# Patient Record
Sex: FEMALE | Race: WHITE | NOT HISPANIC OR LATINO | Employment: OTHER
[De-identification: names, ages, dates, MRNs, and addresses within clinical notes are randomized per-mention and may not be internally consistent; named-entity substitution may affect disease eponyms.]

---

## 2017-02-02 ENCOUNTER — RECORDS - HEALTHEAST (OUTPATIENT)
Dept: ADMINISTRATIVE | Facility: OTHER | Age: 82
End: 2017-02-02

## 2017-02-09 ENCOUNTER — RECORDS - HEALTHEAST (OUTPATIENT)
Dept: ADMINISTRATIVE | Facility: OTHER | Age: 82
End: 2017-02-09

## 2017-05-16 ENCOUNTER — COMMUNICATION - HEALTHEAST (OUTPATIENT)
Dept: INTERNAL MEDICINE | Facility: CLINIC | Age: 82
End: 2017-05-16

## 2017-05-16 ENCOUNTER — COMMUNICATION - HEALTHEAST (OUTPATIENT)
Dept: SCHEDULING | Facility: CLINIC | Age: 82
End: 2017-05-16

## 2017-05-16 ENCOUNTER — AMBULATORY - HEALTHEAST (OUTPATIENT)
Dept: LAB | Facility: CLINIC | Age: 82
End: 2017-05-16

## 2017-05-16 DIAGNOSIS — R30.0 DYSURIA: ICD-10-CM

## 2017-05-16 DIAGNOSIS — N39.0 UTI (URINARY TRACT INFECTION): ICD-10-CM

## 2017-05-24 ENCOUNTER — COMMUNICATION - HEALTHEAST (OUTPATIENT)
Dept: SCHEDULING | Facility: CLINIC | Age: 82
End: 2017-05-24

## 2017-05-24 DIAGNOSIS — R30.0 DYSURIA: ICD-10-CM

## 2017-06-05 ENCOUNTER — AMBULATORY - HEALTHEAST (OUTPATIENT)
Dept: LAB | Facility: CLINIC | Age: 82
End: 2017-06-05

## 2017-06-05 DIAGNOSIS — R30.0 DYSURIA: ICD-10-CM

## 2017-06-08 ENCOUNTER — COMMUNICATION - HEALTHEAST (OUTPATIENT)
Dept: INTERNAL MEDICINE | Facility: CLINIC | Age: 82
End: 2017-06-08

## 2017-06-08 DIAGNOSIS — N39.0 UTI (URINARY TRACT INFECTION): ICD-10-CM

## 2017-06-12 ENCOUNTER — COMMUNICATION - HEALTHEAST (OUTPATIENT)
Dept: INTERNAL MEDICINE | Facility: CLINIC | Age: 82
End: 2017-06-12

## 2017-06-12 DIAGNOSIS — N39.0 UTI (URINARY TRACT INFECTION): ICD-10-CM

## 2017-06-15 ENCOUNTER — AMBULATORY - HEALTHEAST (OUTPATIENT)
Dept: LAB | Facility: CLINIC | Age: 82
End: 2017-06-15

## 2017-06-15 DIAGNOSIS — N39.0 UTI (URINARY TRACT INFECTION): ICD-10-CM

## 2017-06-16 ENCOUNTER — COMMUNICATION - HEALTHEAST (OUTPATIENT)
Dept: INTERNAL MEDICINE | Facility: CLINIC | Age: 82
End: 2017-06-16

## 2017-06-26 ENCOUNTER — RECORDS - HEALTHEAST (OUTPATIENT)
Dept: ADMINISTRATIVE | Facility: OTHER | Age: 82
End: 2017-06-26

## 2017-08-09 ENCOUNTER — COMMUNICATION - HEALTHEAST (OUTPATIENT)
Dept: INTERNAL MEDICINE | Facility: CLINIC | Age: 82
End: 2017-08-09

## 2017-10-02 ENCOUNTER — COMMUNICATION - HEALTHEAST (OUTPATIENT)
Dept: INTERNAL MEDICINE | Facility: CLINIC | Age: 82
End: 2017-10-02

## 2017-10-02 DIAGNOSIS — E78.5 HYPERLIPIDEMIA: ICD-10-CM

## 2017-10-23 ENCOUNTER — COMMUNICATION - HEALTHEAST (OUTPATIENT)
Dept: INTERNAL MEDICINE | Facility: CLINIC | Age: 82
End: 2017-10-23

## 2017-10-23 DIAGNOSIS — I10 HYPERTENSION: ICD-10-CM

## 2017-11-17 ENCOUNTER — OFFICE VISIT - HEALTHEAST (OUTPATIENT)
Dept: INTERNAL MEDICINE | Facility: CLINIC | Age: 82
End: 2017-11-17

## 2017-11-17 DIAGNOSIS — E78.5 HYPERLIPIDEMIA: ICD-10-CM

## 2017-11-17 DIAGNOSIS — K21.9 ESOPHAGEAL REFLUX: ICD-10-CM

## 2017-11-17 DIAGNOSIS — I10 HYPERTENSION: ICD-10-CM

## 2017-11-17 DIAGNOSIS — Z51.81 MEDICATION MONITORING ENCOUNTER: ICD-10-CM

## 2017-11-17 LAB
CHOLEST SERPL-MCNC: 169 MG/DL
FASTING STATUS PATIENT QL REPORTED: YES
HDLC SERPL-MCNC: 37 MG/DL
LDLC SERPL CALC-MCNC: 77 MG/DL
TRIGL SERPL-MCNC: 277 MG/DL

## 2017-11-17 ASSESSMENT — MIFFLIN-ST. JEOR: SCORE: 1281.84

## 2017-11-21 ENCOUNTER — COMMUNICATION - HEALTHEAST (OUTPATIENT)
Dept: INTERNAL MEDICINE | Facility: CLINIC | Age: 82
End: 2017-11-21

## 2017-11-26 ENCOUNTER — COMMUNICATION - HEALTHEAST (OUTPATIENT)
Dept: INTERNAL MEDICINE | Facility: CLINIC | Age: 82
End: 2017-11-26

## 2017-12-15 ENCOUNTER — RECORDS - HEALTHEAST (OUTPATIENT)
Dept: ADMINISTRATIVE | Facility: OTHER | Age: 82
End: 2017-12-15

## 2018-02-02 ENCOUNTER — COMMUNICATION - HEALTHEAST (OUTPATIENT)
Dept: SCHEDULING | Facility: CLINIC | Age: 83
End: 2018-02-02

## 2018-02-02 DIAGNOSIS — N39.0 UTI (URINARY TRACT INFECTION): ICD-10-CM

## 2018-09-13 ENCOUNTER — AMBULATORY - HEALTHEAST (OUTPATIENT)
Dept: INTERNAL MEDICINE | Facility: CLINIC | Age: 83
End: 2018-09-13

## 2018-09-13 ENCOUNTER — COMMUNICATION - HEALTHEAST (OUTPATIENT)
Dept: SCHEDULING | Facility: CLINIC | Age: 83
End: 2018-09-13

## 2018-09-13 ENCOUNTER — AMBULATORY - HEALTHEAST (OUTPATIENT)
Dept: LAB | Facility: CLINIC | Age: 83
End: 2018-09-13

## 2018-09-13 DIAGNOSIS — N30.00 ACUTE CYSTITIS WITHOUT HEMATURIA: ICD-10-CM

## 2018-09-13 LAB
ALBUMIN UR-MCNC: NEGATIVE MG/DL
APPEARANCE UR: ABNORMAL
BACTERIA #/AREA URNS HPF: ABNORMAL HPF
BILIRUB UR QL STRIP: NEGATIVE
COLOR UR AUTO: ABNORMAL
GLUCOSE UR STRIP-MCNC: NEGATIVE MG/DL
HGB UR QL STRIP: NEGATIVE
KETONES UR STRIP-MCNC: NEGATIVE MG/DL
LEUKOCYTE ESTERASE UR QL STRIP: ABNORMAL
MUCOUS THREADS #/AREA URNS LPF: ABNORMAL LPF
NITRATE UR QL: NEGATIVE
PH UR STRIP: 5.5 [PH] (ref 4.5–8)
RBC #/AREA URNS AUTO: ABNORMAL HPF
SP GR UR STRIP: 1.01 (ref 1–1.03)
SQUAMOUS #/AREA URNS AUTO: ABNORMAL LPF
UROBILINOGEN UR STRIP-ACNC: ABNORMAL
WBC #/AREA URNS AUTO: ABNORMAL HPF
WBC CLUMPS #/AREA URNS HPF: PRESENT /[HPF]

## 2018-09-15 LAB — BACTERIA SPEC CULT: ABNORMAL

## 2018-09-16 ENCOUNTER — COMMUNICATION - HEALTHEAST (OUTPATIENT)
Dept: INTERNAL MEDICINE | Facility: CLINIC | Age: 83
End: 2018-09-16

## 2018-09-20 ENCOUNTER — COMMUNICATION - HEALTHEAST (OUTPATIENT)
Dept: INTERNAL MEDICINE | Facility: CLINIC | Age: 83
End: 2018-09-20

## 2018-09-20 DIAGNOSIS — N30.00 ACUTE CYSTITIS WITHOUT HEMATURIA: ICD-10-CM

## 2018-10-19 ENCOUNTER — COMMUNICATION - HEALTHEAST (OUTPATIENT)
Dept: INTERNAL MEDICINE | Facility: CLINIC | Age: 83
End: 2018-10-19

## 2018-10-19 DIAGNOSIS — I10 ESSENTIAL HYPERTENSION: ICD-10-CM

## 2018-11-19 ENCOUNTER — OFFICE VISIT - HEALTHEAST (OUTPATIENT)
Dept: INTERNAL MEDICINE | Facility: CLINIC | Age: 83
End: 2018-11-19

## 2018-11-19 DIAGNOSIS — M81.0 SENILE OSTEOPOROSIS: ICD-10-CM

## 2018-11-19 DIAGNOSIS — E66.01 MORBID OBESITY (H): ICD-10-CM

## 2018-11-19 DIAGNOSIS — Z00.00 MEDICARE ANNUAL WELLNESS VISIT, SUBSEQUENT: ICD-10-CM

## 2018-11-19 DIAGNOSIS — I10 HYPERTENSION: ICD-10-CM

## 2018-11-19 DIAGNOSIS — Z12.31 VISIT FOR SCREENING MAMMOGRAM: ICD-10-CM

## 2018-11-19 DIAGNOSIS — K21.9 ESOPHAGEAL REFLUX: ICD-10-CM

## 2018-11-19 DIAGNOSIS — Z51.81 MEDICATION MONITORING ENCOUNTER: ICD-10-CM

## 2018-11-19 DIAGNOSIS — E78.2 MIXED HYPERLIPIDEMIA: ICD-10-CM

## 2018-11-19 DIAGNOSIS — I10 ESSENTIAL HYPERTENSION: ICD-10-CM

## 2018-11-19 DIAGNOSIS — N30.01 ACUTE CYSTITIS WITH HEMATURIA: ICD-10-CM

## 2018-11-19 LAB
ALBUMIN SERPL-MCNC: 3.9 G/DL (ref 3.5–5)
ALBUMIN UR-MCNC: NEGATIVE MG/DL
ALP SERPL-CCNC: 72 U/L (ref 45–120)
ALT SERPL W P-5'-P-CCNC: 20 U/L (ref 0–45)
ANION GAP SERPL CALCULATED.3IONS-SCNC: 9 MMOL/L (ref 5–18)
APPEARANCE UR: CLEAR
AST SERPL W P-5'-P-CCNC: 18 U/L (ref 0–40)
BACTERIA #/AREA URNS HPF: ABNORMAL HPF
BILIRUB SERPL-MCNC: 0.7 MG/DL (ref 0–1)
BILIRUB UR QL STRIP: NEGATIVE
BUN SERPL-MCNC: 14 MG/DL (ref 8–28)
CALCIUM SERPL-MCNC: 9.3 MG/DL (ref 8.5–10.5)
CHLORIDE BLD-SCNC: 103 MMOL/L (ref 98–107)
CHOLEST SERPL-MCNC: 169 MG/DL
CO2 SERPL-SCNC: 29 MMOL/L (ref 22–31)
COLOR UR AUTO: YELLOW
CREAT SERPL-MCNC: 0.68 MG/DL (ref 0.6–1.1)
ERYTHROCYTE [DISTWIDTH] IN BLOOD BY AUTOMATED COUNT: 12.2 % (ref 11–14.5)
FASTING STATUS PATIENT QL REPORTED: YES
GFR SERPL CREATININE-BSD FRML MDRD: >60 ML/MIN/1.73M2
GLUCOSE BLD-MCNC: 118 MG/DL (ref 70–125)
GLUCOSE UR STRIP-MCNC: NEGATIVE MG/DL
HCT VFR BLD AUTO: 39.7 % (ref 35–47)
HDLC SERPL-MCNC: 40 MG/DL
HGB BLD-MCNC: 13.6 G/DL (ref 12–16)
HGB UR QL STRIP: ABNORMAL
KETONES UR STRIP-MCNC: NEGATIVE MG/DL
LDLC SERPL CALC-MCNC: 76 MG/DL
LEUKOCYTE ESTERASE UR QL STRIP: NEGATIVE
MCH RBC QN AUTO: 31.1 PG (ref 27–34)
MCHC RBC AUTO-ENTMCNC: 34.3 G/DL (ref 32–36)
MCV RBC AUTO: 91 FL (ref 80–100)
NITRATE UR QL: NEGATIVE
PH UR STRIP: 5.5 [PH] (ref 5–8)
PLATELET # BLD AUTO: 194 THOU/UL (ref 140–440)
PMV BLD AUTO: 7.3 FL (ref 7–10)
POTASSIUM BLD-SCNC: 4.2 MMOL/L (ref 3.5–5)
PROT SERPL-MCNC: 7 G/DL (ref 6–8)
RBC # BLD AUTO: 4.37 MILL/UL (ref 3.8–5.4)
RBC #/AREA URNS AUTO: ABNORMAL HPF
SODIUM SERPL-SCNC: 141 MMOL/L (ref 136–145)
SP GR UR STRIP: 1.02 (ref 1–1.03)
SQUAMOUS #/AREA URNS AUTO: ABNORMAL LPF
TRIGL SERPL-MCNC: 266 MG/DL
UROBILINOGEN UR STRIP-ACNC: ABNORMAL
WBC #/AREA URNS AUTO: ABNORMAL HPF
WBC: 5.7 THOU/UL (ref 4–11)

## 2018-11-19 ASSESSMENT — MIFFLIN-ST. JEOR: SCORE: 1277.3

## 2018-11-20 ENCOUNTER — COMMUNICATION - HEALTHEAST (OUTPATIENT)
Dept: INTERNAL MEDICINE | Facility: CLINIC | Age: 83
End: 2018-11-20

## 2019-01-11 ENCOUNTER — RECORDS - HEALTHEAST (OUTPATIENT)
Dept: BONE DENSITY | Facility: CLINIC | Age: 84
End: 2019-01-11

## 2019-01-11 ENCOUNTER — RECORDS - HEALTHEAST (OUTPATIENT)
Dept: ADMINISTRATIVE | Facility: OTHER | Age: 84
End: 2019-01-11

## 2019-01-11 ENCOUNTER — RECORDS - HEALTHEAST (OUTPATIENT)
Dept: MAMMOGRAPHY | Facility: CLINIC | Age: 84
End: 2019-01-11

## 2019-01-11 DIAGNOSIS — Z12.31 ENCOUNTER FOR SCREENING MAMMOGRAM FOR MALIGNANT NEOPLASM OF BREAST: ICD-10-CM

## 2019-01-11 DIAGNOSIS — M81.0 AGE-RELATED OSTEOPOROSIS WITHOUT CURRENT PATHOLOGICAL FRACTURE: ICD-10-CM

## 2019-01-14 ENCOUNTER — COMMUNICATION - HEALTHEAST (OUTPATIENT)
Dept: INTERNAL MEDICINE | Facility: CLINIC | Age: 84
End: 2019-01-14

## 2019-01-18 ENCOUNTER — COMMUNICATION - HEALTHEAST (OUTPATIENT)
Dept: INTERNAL MEDICINE | Facility: CLINIC | Age: 84
End: 2019-01-18

## 2019-01-18 DIAGNOSIS — K21.9 ESOPHAGEAL REFLUX: ICD-10-CM

## 2019-04-22 ENCOUNTER — COMMUNICATION - HEALTHEAST (OUTPATIENT)
Dept: INTERNAL MEDICINE | Facility: CLINIC | Age: 84
End: 2019-04-22

## 2019-04-22 DIAGNOSIS — I10 ESSENTIAL HYPERTENSION: ICD-10-CM

## 2019-08-21 ENCOUNTER — COMMUNICATION - HEALTHEAST (OUTPATIENT)
Dept: INTERNAL MEDICINE | Facility: CLINIC | Age: 84
End: 2019-08-21

## 2019-08-21 DIAGNOSIS — Z01.10 ENCOUNTER FOR HEARING EVALUATION: ICD-10-CM

## 2019-09-17 ENCOUNTER — OFFICE VISIT - HEALTHEAST (OUTPATIENT)
Dept: AUDIOLOGY | Facility: CLINIC | Age: 84
End: 2019-09-17

## 2019-09-17 ENCOUNTER — COMMUNICATION - HEALTHEAST (OUTPATIENT)
Dept: ADMINISTRATIVE | Facility: CLINIC | Age: 84
End: 2019-09-17

## 2019-09-17 DIAGNOSIS — H90.3 SENSORINEURAL HEARING LOSS, BILATERAL: ICD-10-CM

## 2019-09-20 ENCOUNTER — COMMUNICATION - HEALTHEAST (OUTPATIENT)
Dept: SURGERY | Facility: CLINIC | Age: 84
End: 2019-09-20

## 2019-10-19 ENCOUNTER — COMMUNICATION - HEALTHEAST (OUTPATIENT)
Dept: INTERNAL MEDICINE | Facility: CLINIC | Age: 84
End: 2019-10-19

## 2019-10-19 DIAGNOSIS — I10 ESSENTIAL HYPERTENSION: ICD-10-CM

## 2019-11-04 ENCOUNTER — COMMUNICATION - HEALTHEAST (OUTPATIENT)
Dept: INTERNAL MEDICINE | Facility: CLINIC | Age: 84
End: 2019-11-04

## 2019-11-04 DIAGNOSIS — I10 HYPERTENSION: ICD-10-CM

## 2019-11-12 ENCOUNTER — OFFICE VISIT - HEALTHEAST (OUTPATIENT)
Dept: OTOLARYNGOLOGY | Facility: CLINIC | Age: 84
End: 2019-11-12

## 2019-11-12 ENCOUNTER — OFFICE VISIT - HEALTHEAST (OUTPATIENT)
Dept: AUDIOLOGY | Facility: CLINIC | Age: 84
End: 2019-11-12

## 2019-11-12 DIAGNOSIS — H61.23 EXCESSIVE CERUMEN IN BOTH EAR CANALS: ICD-10-CM

## 2019-11-12 DIAGNOSIS — H90.3 SENSORINEURAL HEARING LOSS, BILATERAL: ICD-10-CM

## 2019-11-13 ENCOUNTER — COMMUNICATION - HEALTHEAST (OUTPATIENT)
Dept: INTERNAL MEDICINE | Facility: CLINIC | Age: 84
End: 2019-11-13

## 2019-11-13 ENCOUNTER — AMBULATORY - HEALTHEAST (OUTPATIENT)
Dept: LAB | Facility: CLINIC | Age: 84
End: 2019-11-13

## 2019-11-13 ENCOUNTER — COMMUNICATION - HEALTHEAST (OUTPATIENT)
Dept: SCHEDULING | Facility: CLINIC | Age: 84
End: 2019-11-13

## 2019-11-13 DIAGNOSIS — N30.00 ACUTE CYSTITIS WITHOUT HEMATURIA: ICD-10-CM

## 2019-11-13 DIAGNOSIS — R30.0 DYSURIA: ICD-10-CM

## 2019-11-13 LAB
BACTERIA #/AREA URNS HPF: ABNORMAL HPF
RBC #/AREA URNS AUTO: ABNORMAL HPF
SQUAMOUS #/AREA URNS AUTO: ABNORMAL LPF
WBC #/AREA URNS AUTO: ABNORMAL HPF

## 2019-11-15 LAB — BACTERIA SPEC CULT: ABNORMAL

## 2019-11-23 ENCOUNTER — COMMUNICATION - HEALTHEAST (OUTPATIENT)
Dept: SCHEDULING | Facility: CLINIC | Age: 84
End: 2019-11-23

## 2019-11-23 DIAGNOSIS — N39.0 UTI (URINARY TRACT INFECTION): ICD-10-CM

## 2019-12-05 ENCOUNTER — AMBULATORY - HEALTHEAST (OUTPATIENT)
Dept: AUDIOLOGY | Facility: CLINIC | Age: 84
End: 2019-12-05

## 2019-12-05 ENCOUNTER — COMMUNICATION - HEALTHEAST (OUTPATIENT)
Dept: AUDIOLOGY | Facility: CLINIC | Age: 84
End: 2019-12-05

## 2019-12-05 DIAGNOSIS — H90.3 SENSORINEURAL HEARING LOSS, BILATERAL: ICD-10-CM

## 2019-12-10 ENCOUNTER — AMBULATORY - HEALTHEAST (OUTPATIENT)
Dept: LAB | Facility: CLINIC | Age: 84
End: 2019-12-10

## 2019-12-10 ENCOUNTER — COMMUNICATION - HEALTHEAST (OUTPATIENT)
Dept: INTERNAL MEDICINE | Facility: CLINIC | Age: 84
End: 2019-12-10

## 2019-12-10 DIAGNOSIS — R39.15 URINARY URGENCY: ICD-10-CM

## 2019-12-10 LAB
ALBUMIN UR-MCNC: NEGATIVE MG/DL
APPEARANCE UR: ABNORMAL
BACTERIA #/AREA URNS HPF: ABNORMAL HPF
BILIRUB UR QL STRIP: NEGATIVE
COLOR UR AUTO: YELLOW
GLUCOSE UR STRIP-MCNC: NEGATIVE MG/DL
HGB UR QL STRIP: ABNORMAL
KETONES UR STRIP-MCNC: NEGATIVE MG/DL
LEUKOCYTE ESTERASE UR QL STRIP: ABNORMAL
NITRATE UR QL: NEGATIVE
PH UR STRIP: 6 [PH] (ref 5–8)
RBC #/AREA URNS AUTO: ABNORMAL HPF
SP GR UR STRIP: <=1.005 (ref 1–1.03)
SQUAMOUS #/AREA URNS AUTO: ABNORMAL LPF
UROBILINOGEN UR STRIP-ACNC: ABNORMAL
WBC #/AREA URNS AUTO: ABNORMAL HPF

## 2019-12-11 LAB — BACTERIA SPEC CULT: NO GROWTH

## 2019-12-13 ENCOUNTER — COMMUNICATION - HEALTHEAST (OUTPATIENT)
Dept: INTERNAL MEDICINE | Facility: CLINIC | Age: 84
End: 2019-12-13

## 2019-12-17 ENCOUNTER — COMMUNICATION - HEALTHEAST (OUTPATIENT)
Dept: INTERNAL MEDICINE | Facility: CLINIC | Age: 84
End: 2019-12-17

## 2019-12-17 DIAGNOSIS — E78.2 MIXED HYPERLIPIDEMIA: ICD-10-CM

## 2019-12-20 ENCOUNTER — COMMUNICATION - HEALTHEAST (OUTPATIENT)
Dept: INTERNAL MEDICINE | Facility: CLINIC | Age: 84
End: 2019-12-20

## 2019-12-23 ENCOUNTER — OFFICE VISIT - HEALTHEAST (OUTPATIENT)
Dept: INTERNAL MEDICINE | Facility: CLINIC | Age: 84
End: 2019-12-23

## 2019-12-23 DIAGNOSIS — K21.9 ESOPHAGEAL REFLUX: ICD-10-CM

## 2019-12-23 DIAGNOSIS — E78.2 MIXED HYPERLIPIDEMIA: ICD-10-CM

## 2019-12-23 DIAGNOSIS — Z51.81 ENCOUNTER FOR THERAPEUTIC DRUG MONITORING: ICD-10-CM

## 2019-12-23 DIAGNOSIS — I10 ESSENTIAL HYPERTENSION, BENIGN: ICD-10-CM

## 2019-12-23 DIAGNOSIS — Z23 NEED FOR PNEUMOCOCCAL VACCINATION: ICD-10-CM

## 2019-12-23 DIAGNOSIS — I10 ESSENTIAL HYPERTENSION: ICD-10-CM

## 2019-12-23 DIAGNOSIS — E66.01 MORBID OBESITY (H): ICD-10-CM

## 2019-12-23 LAB
ALBUMIN SERPL-MCNC: 4.2 G/DL (ref 3.5–5)
ALP SERPL-CCNC: 70 U/L (ref 45–120)
ALT SERPL W P-5'-P-CCNC: 16 U/L (ref 0–45)
ANION GAP SERPL CALCULATED.3IONS-SCNC: 13 MMOL/L (ref 5–18)
AST SERPL W P-5'-P-CCNC: 16 U/L (ref 0–40)
BILIRUB SERPL-MCNC: 0.8 MG/DL (ref 0–1)
BUN SERPL-MCNC: 12 MG/DL (ref 8–28)
CALCIUM SERPL-MCNC: 9.3 MG/DL (ref 8.5–10.5)
CHLORIDE BLD-SCNC: 102 MMOL/L (ref 98–107)
CHOLEST SERPL-MCNC: 182 MG/DL
CO2 SERPL-SCNC: 27 MMOL/L (ref 22–31)
CREAT SERPL-MCNC: 0.77 MG/DL (ref 0.6–1.1)
ERYTHROCYTE [DISTWIDTH] IN BLOOD BY AUTOMATED COUNT: 11.5 % (ref 11–14.5)
FASTING STATUS PATIENT QL REPORTED: YES
GFR SERPL CREATININE-BSD FRML MDRD: >60 ML/MIN/1.73M2
GLUCOSE BLD-MCNC: 104 MG/DL (ref 70–125)
HCT VFR BLD AUTO: 42.9 % (ref 35–47)
HDLC SERPL-MCNC: 42 MG/DL
HGB BLD-MCNC: 14.4 G/DL (ref 12–16)
LDLC SERPL CALC-MCNC: 83 MG/DL
MCH RBC QN AUTO: 31.4 PG (ref 27–34)
MCHC RBC AUTO-ENTMCNC: 33.5 G/DL (ref 32–36)
MCV RBC AUTO: 94 FL (ref 80–100)
PLATELET # BLD AUTO: 234 THOU/UL (ref 140–440)
PMV BLD AUTO: 7.4 FL (ref 7–10)
POTASSIUM BLD-SCNC: 3.9 MMOL/L (ref 3.5–5)
PROT SERPL-MCNC: 7.2 G/DL (ref 6–8)
RBC # BLD AUTO: 4.59 MILL/UL (ref 3.8–5.4)
SODIUM SERPL-SCNC: 142 MMOL/L (ref 136–145)
TRIGL SERPL-MCNC: 287 MG/DL
WBC: 8 THOU/UL (ref 4–11)

## 2019-12-23 ASSESSMENT — MIFFLIN-ST. JEOR: SCORE: 1273.33

## 2019-12-23 ASSESSMENT — PATIENT HEALTH QUESTIONNAIRE - PHQ9: SUM OF ALL RESPONSES TO PHQ QUESTIONS 1-9: 0

## 2019-12-23 ASSESSMENT — ANXIETY QUESTIONNAIRES
1. FEELING NERVOUS, ANXIOUS, OR ON EDGE: NOT AT ALL
2. NOT BEING ABLE TO STOP OR CONTROL WORRYING: NOT AT ALL

## 2019-12-30 ENCOUNTER — COMMUNICATION - HEALTHEAST (OUTPATIENT)
Dept: INTERNAL MEDICINE | Facility: CLINIC | Age: 84
End: 2019-12-30

## 2020-01-08 ENCOUNTER — COMMUNICATION - HEALTHEAST (OUTPATIENT)
Dept: SURGERY | Facility: CLINIC | Age: 85
End: 2020-01-08

## 2020-03-16 ENCOUNTER — COMMUNICATION - HEALTHEAST (OUTPATIENT)
Dept: INTERNAL MEDICINE | Facility: CLINIC | Age: 85
End: 2020-03-16

## 2020-03-16 DIAGNOSIS — K21.9 ESOPHAGEAL REFLUX: ICD-10-CM

## 2020-05-05 ENCOUNTER — COMMUNICATION - HEALTHEAST (OUTPATIENT)
Dept: INTERNAL MEDICINE | Facility: CLINIC | Age: 85
End: 2020-05-05

## 2020-05-05 DIAGNOSIS — I10 ESSENTIAL HYPERTENSION: ICD-10-CM

## 2020-12-10 ENCOUNTER — COMMUNICATION - HEALTHEAST (OUTPATIENT)
Dept: INTERNAL MEDICINE | Facility: CLINIC | Age: 85
End: 2020-12-10

## 2020-12-10 DIAGNOSIS — E78.2 MIXED HYPERLIPIDEMIA: ICD-10-CM

## 2020-12-10 RX ORDER — SIMVASTATIN 40 MG
40 TABLET ORAL AT BEDTIME
Qty: 90 TABLET | Refills: 3 | Status: SHIPPED | OUTPATIENT
Start: 2020-12-10 | End: 2022-01-06

## 2020-12-28 ENCOUNTER — OFFICE VISIT - HEALTHEAST (OUTPATIENT)
Dept: INTERNAL MEDICINE | Facility: CLINIC | Age: 85
End: 2020-12-28

## 2020-12-28 DIAGNOSIS — M15.0 PRIMARY OSTEOARTHRITIS INVOLVING MULTIPLE JOINTS: ICD-10-CM

## 2020-12-28 DIAGNOSIS — M94.9 DISORDER OF BONE AND CARTILAGE: ICD-10-CM

## 2020-12-28 DIAGNOSIS — I10 ESSENTIAL HYPERTENSION, BENIGN: ICD-10-CM

## 2020-12-28 DIAGNOSIS — M89.9 DISORDER OF BONE AND CARTILAGE: ICD-10-CM

## 2020-12-28 DIAGNOSIS — E78.2 MIXED HYPERLIPIDEMIA: ICD-10-CM

## 2020-12-28 DIAGNOSIS — C43.30 MALIGNANT MELANOMA OF FACE EXCLUDING EYELID, NOSE, LIP, AND EAR (H): ICD-10-CM

## 2020-12-28 DIAGNOSIS — E66.01 MORBID OBESITY (H): ICD-10-CM

## 2020-12-28 DIAGNOSIS — K21.9 ESOPHAGEAL REFLUX: ICD-10-CM

## 2020-12-28 DIAGNOSIS — Z00.00 MEDICARE ANNUAL WELLNESS VISIT, SUBSEQUENT: ICD-10-CM

## 2020-12-28 DIAGNOSIS — E55.9 VITAMIN D DEFICIENCY: ICD-10-CM

## 2020-12-28 LAB
ALBUMIN SERPL-MCNC: 4.1 G/DL (ref 3.5–5)
ALBUMIN UR-MCNC: NEGATIVE MG/DL
ALP SERPL-CCNC: 72 U/L (ref 45–120)
ALT SERPL W P-5'-P-CCNC: 28 U/L (ref 0–45)
ANION GAP SERPL CALCULATED.3IONS-SCNC: 13 MMOL/L (ref 5–18)
APPEARANCE UR: CLEAR
AST SERPL W P-5'-P-CCNC: 22 U/L (ref 0–40)
BACTERIA #/AREA URNS HPF: ABNORMAL HPF
BILIRUB SERPL-MCNC: 0.7 MG/DL (ref 0–1)
BILIRUB UR QL STRIP: NEGATIVE
BUN SERPL-MCNC: 14 MG/DL (ref 8–28)
CALCIUM SERPL-MCNC: 9.2 MG/DL (ref 8.5–10.5)
CHLORIDE BLD-SCNC: 101 MMOL/L (ref 98–107)
CHOLEST SERPL-MCNC: 176 MG/DL
CO2 SERPL-SCNC: 28 MMOL/L (ref 22–31)
COLOR UR AUTO: YELLOW
CREAT SERPL-MCNC: 0.75 MG/DL (ref 0.6–1.1)
ERYTHROCYTE [DISTWIDTH] IN BLOOD BY AUTOMATED COUNT: 11.4 % (ref 11–14.5)
FASTING STATUS PATIENT QL REPORTED: YES
GFR SERPL CREATININE-BSD FRML MDRD: >60 ML/MIN/1.73M2
GLUCOSE BLD-MCNC: 175 MG/DL (ref 70–125)
GLUCOSE UR STRIP-MCNC: NEGATIVE MG/DL
HCT VFR BLD AUTO: 42 % (ref 35–47)
HDLC SERPL-MCNC: 36 MG/DL
HGB BLD-MCNC: 14.6 G/DL (ref 12–16)
HGB UR QL STRIP: ABNORMAL
KETONES UR STRIP-MCNC: NEGATIVE MG/DL
LDLC SERPL CALC-MCNC: 81 MG/DL
LDLC SERPL CALC-MCNC: ABNORMAL MG/DL
LEUKOCYTE ESTERASE UR QL STRIP: ABNORMAL
MCH RBC QN AUTO: 31.4 PG (ref 27–34)
MCHC RBC AUTO-ENTMCNC: 34.7 G/DL (ref 32–36)
MCV RBC AUTO: 91 FL (ref 80–100)
NITRATE UR QL: NEGATIVE
PH UR STRIP: 5.5 [PH] (ref 5–8)
PLATELET # BLD AUTO: 202 THOU/UL (ref 140–440)
PMV BLD AUTO: 7.6 FL (ref 7–10)
POTASSIUM BLD-SCNC: 4.3 MMOL/L (ref 3.5–5)
PROT SERPL-MCNC: 7.2 G/DL (ref 6–8)
RBC # BLD AUTO: 4.64 MILL/UL (ref 3.8–5.4)
RBC #/AREA URNS AUTO: ABNORMAL HPF
SODIUM SERPL-SCNC: 142 MMOL/L (ref 136–145)
SP GR UR STRIP: >=1.03 (ref 1–1.03)
SQUAMOUS #/AREA URNS AUTO: ABNORMAL LPF
TRIGL SERPL-MCNC: 460 MG/DL
UROBILINOGEN UR STRIP-ACNC: ABNORMAL
WBC #/AREA URNS AUTO: ABNORMAL HPF
WBC: 7.8 THOU/UL (ref 4–11)

## 2020-12-28 RX ORDER — LOSARTAN POTASSIUM AND HYDROCHLOROTHIAZIDE 12.5; 5 MG/1; MG/1
TABLET ORAL
Qty: 90 TABLET | Refills: 3 | Status: SHIPPED | OUTPATIENT
Start: 2020-12-28 | End: 2022-01-06

## 2020-12-28 RX ORDER — METOPROLOL SUCCINATE 50 MG/1
50 TABLET, EXTENDED RELEASE ORAL DAILY
Qty: 90 TABLET | Refills: 3 | Status: SHIPPED | OUTPATIENT
Start: 2020-12-28 | End: 2022-01-06

## 2020-12-28 ASSESSMENT — PATIENT HEALTH QUESTIONNAIRE - PHQ9: SUM OF ALL RESPONSES TO PHQ QUESTIONS 1-9: 0

## 2020-12-28 ASSESSMENT — MIFFLIN-ST. JEOR: SCORE: 1291.47

## 2020-12-29 LAB — 25(OH)D3 SERPL-MCNC: 40.2 NG/ML (ref 30–80)

## 2020-12-30 ENCOUNTER — COMMUNICATION - HEALTHEAST (OUTPATIENT)
Dept: INTERNAL MEDICINE | Facility: CLINIC | Age: 85
End: 2020-12-30

## 2020-12-30 ENCOUNTER — AMBULATORY - HEALTHEAST (OUTPATIENT)
Dept: INTERNAL MEDICINE | Facility: CLINIC | Age: 85
End: 2020-12-30

## 2020-12-30 DIAGNOSIS — R73.9 HYPERGLYCEMIA: ICD-10-CM

## 2020-12-30 LAB — BACTERIA SPEC CULT: ABNORMAL

## 2021-01-08 ENCOUNTER — RECORDS - HEALTHEAST (OUTPATIENT)
Dept: ADMINISTRATIVE | Facility: OTHER | Age: 86
End: 2021-01-08

## 2021-01-18 ENCOUNTER — COMMUNICATION - HEALTHEAST (OUTPATIENT)
Dept: INTERNAL MEDICINE | Facility: CLINIC | Age: 86
End: 2021-01-18

## 2021-01-18 ENCOUNTER — AMBULATORY - HEALTHEAST (OUTPATIENT)
Dept: LAB | Facility: CLINIC | Age: 86
End: 2021-01-18

## 2021-01-18 DIAGNOSIS — E11.65 TYPE 2 DIABETES MELLITUS WITH HYPERGLYCEMIA, WITHOUT LONG-TERM CURRENT USE OF INSULIN (H): ICD-10-CM

## 2021-01-18 DIAGNOSIS — R73.9 HYPERGLYCEMIA: ICD-10-CM

## 2021-01-18 LAB — HBA1C MFR BLD: 8 %

## 2021-01-19 RX ORDER — METFORMIN HCL 500 MG
TABLET, EXTENDED RELEASE 24 HR ORAL
Qty: 90 TABLET | Refills: 3 | Status: SHIPPED | OUTPATIENT
Start: 2021-01-19 | End: 2022-01-06

## 2021-01-25 ENCOUNTER — OFFICE VISIT - HEALTHEAST (OUTPATIENT)
Dept: EDUCATION SERVICES | Facility: CLINIC | Age: 86
End: 2021-01-25

## 2021-01-25 ENCOUNTER — AMBULATORY - HEALTHEAST (OUTPATIENT)
Dept: EDUCATION SERVICES | Facility: CLINIC | Age: 86
End: 2021-01-25

## 2021-01-25 DIAGNOSIS — E11.65 TYPE 2 DIABETES MELLITUS WITH HYPERGLYCEMIA, WITHOUT LONG-TERM CURRENT USE OF INSULIN (H): ICD-10-CM

## 2021-01-29 ENCOUNTER — COMMUNICATION - HEALTHEAST (OUTPATIENT)
Dept: INTERNAL MEDICINE | Facility: CLINIC | Age: 86
End: 2021-01-29

## 2021-01-29 DIAGNOSIS — E11.65 TYPE 2 DIABETES MELLITUS WITH HYPERGLYCEMIA, WITHOUT LONG-TERM CURRENT USE OF INSULIN (H): ICD-10-CM

## 2021-02-01 ENCOUNTER — COMMUNICATION - HEALTHEAST (OUTPATIENT)
Dept: INTERNAL MEDICINE | Facility: CLINIC | Age: 86
End: 2021-02-01

## 2021-02-01 DIAGNOSIS — E11.65 TYPE 2 DIABETES MELLITUS WITH HYPERGLYCEMIA, WITHOUT LONG-TERM CURRENT USE OF INSULIN (H): ICD-10-CM

## 2021-02-22 ENCOUNTER — OFFICE VISIT - HEALTHEAST (OUTPATIENT)
Dept: EDUCATION SERVICES | Facility: CLINIC | Age: 86
End: 2021-02-22

## 2021-02-22 DIAGNOSIS — E11.65 TYPE 2 DIABETES MELLITUS WITH HYPERGLYCEMIA, WITHOUT LONG-TERM CURRENT USE OF INSULIN (H): ICD-10-CM

## 2021-03-08 ENCOUNTER — OFFICE VISIT - HEALTHEAST (OUTPATIENT)
Dept: EDUCATION SERVICES | Facility: CLINIC | Age: 86
End: 2021-03-08

## 2021-03-08 DIAGNOSIS — E11.65 TYPE 2 DIABETES MELLITUS WITH HYPERGLYCEMIA, WITHOUT LONG-TERM CURRENT USE OF INSULIN (H): ICD-10-CM

## 2021-04-14 ENCOUNTER — OFFICE VISIT - HEALTHEAST (OUTPATIENT)
Dept: EDUCATION SERVICES | Facility: CLINIC | Age: 86
End: 2021-04-14

## 2021-04-14 DIAGNOSIS — E11.65 TYPE 2 DIABETES MELLITUS WITH HYPERGLYCEMIA, WITHOUT LONG-TERM CURRENT USE OF INSULIN (H): ICD-10-CM

## 2021-04-30 ENCOUNTER — OFFICE VISIT - HEALTHEAST (OUTPATIENT)
Dept: INTERNAL MEDICINE | Facility: CLINIC | Age: 86
End: 2021-04-30

## 2021-04-30 DIAGNOSIS — E11.65 TYPE 2 DIABETES MELLITUS WITH HYPERGLYCEMIA, WITHOUT LONG-TERM CURRENT USE OF INSULIN (H): ICD-10-CM

## 2021-04-30 DIAGNOSIS — E66.01 MORBID OBESITY (H): ICD-10-CM

## 2021-04-30 DIAGNOSIS — E78.2 MIXED HYPERLIPIDEMIA: ICD-10-CM

## 2021-04-30 DIAGNOSIS — I10 ESSENTIAL HYPERTENSION: ICD-10-CM

## 2021-04-30 LAB
ANION GAP SERPL CALCULATED.3IONS-SCNC: 12 MMOL/L (ref 5–18)
BUN SERPL-MCNC: 19 MG/DL (ref 8–28)
CALCIUM SERPL-MCNC: 9.4 MG/DL (ref 8.5–10.5)
CHLORIDE BLD-SCNC: 103 MMOL/L (ref 98–107)
CHOLEST SERPL-MCNC: 153 MG/DL
CO2 SERPL-SCNC: 28 MMOL/L (ref 22–31)
CREAT SERPL-MCNC: 0.75 MG/DL (ref 0.6–1.1)
FASTING STATUS PATIENT QL REPORTED: ABNORMAL
GFR SERPL CREATININE-BSD FRML MDRD: >60 ML/MIN/1.73M2
GLUCOSE BLD-MCNC: 103 MG/DL (ref 70–125)
HBA1C MFR BLD: 6.1 %
HDLC SERPL-MCNC: 34 MG/DL
LDLC SERPL CALC-MCNC: 77 MG/DL
POTASSIUM BLD-SCNC: 4.2 MMOL/L (ref 3.5–5)
SODIUM SERPL-SCNC: 143 MMOL/L (ref 136–145)
TRIGL SERPL-MCNC: 210 MG/DL

## 2021-04-30 RX ORDER — VIT C/HESPERIDIN/BIOFLAVONOIDS 500-100 MG
TABLET ORAL
Status: SHIPPED | COMMUNITY
Start: 2021-04-30

## 2021-04-30 ASSESSMENT — MIFFLIN-ST. JEOR: SCORE: 1224.8

## 2021-05-03 ENCOUNTER — COMMUNICATION - HEALTHEAST (OUTPATIENT)
Dept: INTERNAL MEDICINE | Facility: CLINIC | Age: 86
End: 2021-05-03

## 2021-05-19 ENCOUNTER — OFFICE VISIT - HEALTHEAST (OUTPATIENT)
Dept: EDUCATION SERVICES | Facility: CLINIC | Age: 86
End: 2021-05-19

## 2021-05-19 DIAGNOSIS — E11.65 TYPE 2 DIABETES MELLITUS WITH HYPERGLYCEMIA, WITHOUT LONG-TERM CURRENT USE OF INSULIN (H): ICD-10-CM

## 2021-05-25 ENCOUNTER — RECORDS - HEALTHEAST (OUTPATIENT)
Dept: ADMINISTRATIVE | Facility: CLINIC | Age: 86
End: 2021-05-25

## 2021-05-26 ENCOUNTER — RECORDS - HEALTHEAST (OUTPATIENT)
Dept: ADMINISTRATIVE | Facility: CLINIC | Age: 86
End: 2021-05-26

## 2021-05-26 ASSESSMENT — PATIENT HEALTH QUESTIONNAIRE - PHQ9
SUM OF ALL RESPONSES TO PHQ QUESTIONS 1-9: 0
SUM OF ALL RESPONSES TO PHQ QUESTIONS 1-9: 0

## 2021-05-27 ENCOUNTER — RECORDS - HEALTHEAST (OUTPATIENT)
Dept: ADMINISTRATIVE | Facility: CLINIC | Age: 86
End: 2021-05-27

## 2021-05-28 ENCOUNTER — RECORDS - HEALTHEAST (OUTPATIENT)
Dept: ADMINISTRATIVE | Facility: CLINIC | Age: 86
End: 2021-05-28

## 2021-05-28 NOTE — TELEPHONE ENCOUNTER
Refill Approved    Rx renewed per Medication Renewal Policy. Medication was last renewed on 11/19/18.    Amy Tsang, [facility] Triage/Med Refill 4/24/2019     Requested Prescriptions   Pending Prescriptions Disp Refills     metoprolol succinate (TOPROL-XL) 50 MG 24 hr tablet [Pharmacy Med Name: METOPROLOL ER SUCCINATE 50MG TABS] 90 tablet 0     Sig: TAKE 1 TABLET BY MOUTH DAILY       Beta-Blockers Refill Protocol Passed - 4/22/2019 12:19 PM        Passed - PCP or prescribing provider visit in past 12 months or next 3 months     Last office visit with prescriber/PCP: Visit date not found OR same dept: Visit date not found OR same specialty: Visit date not found  Last physical: 11/19/2018 Last MTM visit: Visit date not found   Next visit within 3 mo: Visit date not found  Next physical within 3 mo: Visit date not found  Prescriber OR PCP: Benito Medina MD  Last diagnosis associated with med order: 1. Hypertension  - metoprolol succinate (TOPROL-XL) 50 MG 24 hr tablet [Pharmacy Med Name: METOPROLOL ER SUCCINATE 50MG TABS]; TAKE 1 TABLET BY MOUTH DAILY  Dispense: 90 tablet; Refill: 0    If protocol passes may refill for 12 months if within 3 months of last provider visit (or a total of 15 months).             Passed - Blood pressure filed in past 12 months     BP Readings from Last 1 Encounters:   11/19/18 130/70

## 2021-05-29 ENCOUNTER — RECORDS - HEALTHEAST (OUTPATIENT)
Dept: ADMINISTRATIVE | Facility: CLINIC | Age: 86
End: 2021-05-29

## 2021-05-30 ENCOUNTER — RECORDS - HEALTHEAST (OUTPATIENT)
Dept: ADMINISTRATIVE | Facility: CLINIC | Age: 86
End: 2021-05-30

## 2021-05-31 ENCOUNTER — RECORDS - HEALTHEAST (OUTPATIENT)
Dept: ADMINISTRATIVE | Facility: CLINIC | Age: 86
End: 2021-05-31

## 2021-05-31 VITALS — HEIGHT: 62 IN | WEIGHT: 197 LBS | BODY MASS INDEX: 36.25 KG/M2

## 2021-05-31 NOTE — TELEPHONE ENCOUNTER
Referral Request  Type of referral: Audiology   Who s requesting: Patient  Why the request: Patient current Audiology doctor moved to different place .  Have you been seen for this request: N/A  Does patient have a preference on a group/provider? [place] [facility]   Okay to leave a detailed message?  No

## 2021-06-01 ENCOUNTER — RECORDS - HEALTHEAST (OUTPATIENT)
Dept: ADMINISTRATIVE | Facility: CLINIC | Age: 86
End: 2021-06-01

## 2021-06-01 NOTE — PROGRESS NOTES
Audiology Report:    Referring Provider:  Dr. Sher    SUBJECTIVE: Tawny Santos, 85 y.o. female, was seen 09/16/19 for a comprehensive hearing evaluation. She was accompanied by her .    Tawny reports gradual hearing loss (right worse than left). 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 Denies tinnitus, otalgia, aural fullness, otologic surgery, dizziness, family history of hearing loss, and noise exposure.     OBJECTIVE:    Left Ear Right Ear   Otoscopy Clear canal Partially occluding cerumen   Pure Tone Audiometry Normal sloping to moderately severe sensorineural hearing loss Normal sloping to severe sensorineural hearing loss   Word Recognition excellent excellent   Tympanometry shallow (Type As)  shallow (Type As)     Transducer: Insert earphones and Circumaural headphones    Reliability was good  and there was good SRT to PTA agreement.       Tawny expressed interest in hearing aids. She is a bilateral hearing aid candidate. Manufacturers, styles, technology levels, and realistic expectations were discussed. Tawny would like hearing aids that are small and rechargeable.    Hearing Aids  :  Phonak  Model:  Credoraxeo M50-R  Style:  GLENROY  : 2xS  Color:  P5 (Champagne)  Domes: Cap dome recommended due to stenotic canal    ASSESSMENT: Hearing evaluation and hearing aid consultation completed. Hearing aids ordered.    PLAN: Schedulers will contact Tawny to schedule an appointment 3 weeks out with ENT for cerumen removal, and audiology for a hearing aid fitting. Tawny was instructed to cancel the hearing aid fitting appointment if she decides to go to [facility] for hearing aid care.     Please see audiogram under  media  and  audiogram  in the patient s chart.     Saranya Bruce.  Clinical Audiologist  [place] #[id number]

## 2021-06-01 NOTE — TELEPHONE ENCOUNTER
Pt Tawny Santos [birth year] ph [phone]. Pt is questioning if the price quoted was for one hearing aid or two

## 2021-06-02 ENCOUNTER — RECORDS - HEALTHEAST (OUTPATIENT)
Dept: ADMINISTRATIVE | Facility: CLINIC | Age: 86
End: 2021-06-02

## 2021-06-02 VITALS — WEIGHT: 196 LBS | HEIGHT: 62 IN | BODY MASS INDEX: 36.07 KG/M2

## 2021-06-02 NOTE — TELEPHONE ENCOUNTER
Refill Approved    Rx renewed per Medication Renewal Policy. Medication was last renewed on 4/24/19.    Mey Robin, [facility] Triage/Med Refill 10/19/2019     Requested Prescriptions   Pending Prescriptions Disp Refills     metoprolol succinate (TOPROL-XL) 50 MG 24 hr tablet [Pharmacy Med Name: METOPROLOL ER SUCCINATE 50MG TABS] 90 tablet 0     Sig: TAKE 1 TABLET BY MOUTH DAILY       Beta-Blockers Refill Protocol Passed - 10/19/2019  9:07 AM        Passed - PCP or prescribing provider visit in past 12 months or next 3 months     Last office visit with prescriber/PCP: Visit date not found OR same dept: Visit date not found OR same specialty: Visit date not found  Last physical: 11/19/2018 Last MTM visit: Visit date not found   Next visit within 3 mo: Visit date not found  Next physical within 3 mo: Visit date not found  Prescriber OR PCP: Benito Medina MD  Last diagnosis associated with med order: 1. Hypertension  - metoprolol succinate (TOPROL-XL) 50 MG 24 hr tablet [Pharmacy Med Name: METOPROLOL ER SUCCINATE 50MG TABS]; TAKE 1 TABLET BY MOUTH DAILY  Dispense: 90 tablet; Refill: 0    If protocol passes may refill for 12 months if within 3 months of last provider visit (or a total of 15 months).             Passed - Blood pressure filed in past 12 months     BP Readings from Last 1 Encounters:   11/19/18 130/70

## 2021-06-03 NOTE — PROGRESS NOTES
HPI: This patient presents to clinic today for cerumen removal. Has noticed some fullness of the ears and muffled hearing, but denies otalgia, otorrhea, vertigo, change in true hearing or tinnitus. Denies other symptoms.    Past medical history, surgical history, family history, social history, medications, and allergies have been reviewed and are documented above.     Review of Systems: a 10-system review was performed. Symptoms are noted in the HPI and on a scanned document in the computer chart.    Physical Examination:   GEN: no acute distress, alert and oriented  EYES: extraocular movements intact, pupils equal and round. Sclera clear.   EARS: Narrow ear canals bilaterally. Excess cerumen bilaterally cleared under binocular microscopy using loop/forceps. The tympanic membranes are noted to be intact and clear with no signs of infections, effusions, perforations, or retractions.  PULM: breathing comfortably on room air with no stertor or stridor. Chest expansion symmetric.  CARDS: no cyanosis or clubbing, normal carotid pulses    MEDICAL DECISION-MAKING: cerumen impactions cleared under binocular microscopy without difficulty. Has HA fitting today.

## 2021-06-03 NOTE — TELEPHONE ENCOUNTER
Urine analysis shows pyuria.  She will be started on cephalexin 500 mg 3 times daily for 5 days while awaiting urine culture results.  Please notify Tawny of this plan.

## 2021-06-03 NOTE — TELEPHONE ENCOUNTER
"Currently has urinary frequency and urgency. Burning.  No blood in urine or fever.  Has been having this since she had diarrhea in late October. Had taken cefelexan she had at home but ran out 2 days ago.    Patient asking for lab only orders at [place] clinic to be placed so she can come give a sample.    Patient leaving at 10am.  Ok to leave detailed message at home number.    She also added that she does not want a DO to prescribe for her.  She is a former nurse and \"had a friend get c-diff from a medication error from a DO\".  I advised her I do not know who is covering for Dr. Medina today. We have both MD's and DO's as providers and both are seen as equal.    Martha Jiménez, RN, [facility] Nurse Triage/Med Refills RN     Reason for Disposition    > 2 UTI's in last year    Protocols used: URINATION PAIN - FEMALE-A-AH      "

## 2021-06-03 NOTE — TELEPHONE ENCOUNTER
Due to be seen with labs    Rx renewed per Medication Renewal Policy. Medication was last renewed on 11/19/18.    Amy Tsang, [facility] Triage/Med Refill 11/4/2019     Requested Prescriptions   Pending Prescriptions Disp Refills     losartan-hydrochlorothiazide (HYZAAR) 50-12.5 mg per tablet [Pharmacy Med Name: LOSARTAN/HCTZ 50/12.5MG TABLETS] 90 tablet 0     Sig: TAKE 1 TABLET BY MOUTH DAILY       Diuretics/Combination Diuretics Refill Protocol  Passed - 11/4/2019  9:24 AM        Passed - Visit with PCP or prescribing provider visit in past 12 months     Last office visit with prescriber/PCP: Visit date not found OR same dept: Visit date not found OR same specialty: Visit date not found  Last physical: 11/19/2018 Last MTM visit: Visit date not found   Next visit within 3 mo: Visit date not found  Next physical within 3 mo: Visit date not found  Prescriber OR PCP: Benito Medina MD  Last diagnosis associated with med order: 1. Hypertension  - losartan-hydrochlorothiazide (HYZAAR) 50-12.5 mg per tablet [Pharmacy Med Name: LOSARTAN/HCTZ 50/12.5MG TABLETS]; TAKE 1 TABLET BY MOUTH DAILY  Dispense: 90 tablet; Refill: 0    If protocol passes may refill for 12 months if within 3 months of last provider visit (or a total of 15 months).             Passed - Serum Potassium in past 12 months      Lab Results   Component Value Date    Potassium 4.2 11/19/2018             Passed - Serum Sodium in past 12 months      Lab Results   Component Value Date    Sodium 141 11/19/2018             Passed - Blood pressure on file in past 12 months     BP Readings from Last 1 Encounters:   11/19/18 130/70             Passed - Serum Creatinine in past 12 months      Creatinine   Date Value Ref Range Status   11/19/2018 0.68 0.60 - 1.10 mg/dL Final

## 2021-06-03 NOTE — PROGRESS NOTES
Hearing Aid Fitting    Tawny was seen today for a hearing aid fitting. She had her ears cleaned by Dr. Denton prior to the hearing aid fitting today. Tawny has a history of normal sloping to moderately-severe sensorineural hearing loss in her left ear and normal sloping to severe sensorineural hearing loss in her right ear. She signs the hearing aid contract and she is provided a copy of it today.  Tawny was fit with bilateral Phonak Audeo M50-R devices. The hearing aids are fit with size 2 moderate power receivers and small open domes.    Hearing aid:  : Phonak  Devices:  Audeo M50-R  Style:  GLENROY WELLS  Serial numbers:  R: [id number], L: [id number]  Batteries:  rechargeable  Warranty expiration:  12/23/2021    Otoscopy reveals no occlusions, bilaterally.  Real ear measurements revealed appropriate audibility and output for NAL-NL2 gain targets. The overall gain is reduced to 90% and occlusion compensation is set to strong for patient comfort.     The patient reports satisfaction with the fit and sound quality of the instruments.  Use, care, trial period and realistic expectations were reviewed in detail.  Push button is set to increase and decrease volume with a short press.  She is able to demonstrate independent manipulation of the instruments with the hearing aid  and insertion/removal.  She is given written instruction on use, care, and warranty.  She sets up a follow up appointment in 3 weeks.    The patient verbalized understanding and is in agreement with this plan.    Saranya Monterroso., [facility]  [place] Licensed Audiologist #[id number]

## 2021-06-04 VITALS
OXYGEN SATURATION: 98 % | HEIGHT: 62 IN | HEART RATE: 66 BPM | DIASTOLIC BLOOD PRESSURE: 74 MMHG | WEIGHT: 196 LBS | SYSTOLIC BLOOD PRESSURE: 132 MMHG | BODY MASS INDEX: 36.07 KG/M2

## 2021-06-04 NOTE — PROGRESS NOTES
Hearing Aid Return    Tawny stopped into the [facility] today to return her hearing aids. She reports she didn't notice a huge change with the hearing aids so she decided to return them. She is told that her money will be refunded with the exception of the $250 trial period fee. Her hearing aids will be returned to [facility] for credit.    Saranya Monterroso., [facility]  [place] Licensed Audiologist #[id number]

## 2021-06-04 NOTE — PATIENT INSTRUCTIONS - HE
Patient Education   Urinary Incontinence, Female (Adult)  Urinary incontinence means loss of control of the bladder. This problem affects many women, especially as they get older. If you have incontinence, you may be embarrassed to ask for help. But know that this problem can be treated.  Types of Incontinence  There are different types of incontinence. Two of the main types are described here. You can have more than one type.    Stress incontinence. With this type, urine leaks when pressure (stress) is put on the bladder. This may happen when you cough, sneeze, or laugh. Stress incontinence most often occurs because the pelvic floor muscles that support the bladder and urethra are weak. This can happen after pregnancy and vaginal childbirth or a hysterectomy. It can also be due to excess body weight or hormone changes.    Urge incontinence (also called overactive bladder). With this type, a sudden urge to urinate is felt often. This may happen even though there may not be much urine in the bladder. The need to urinate often during the night is common. Urge incontinence most often occurs because of bladder spasms. This may be due to bladder irritation or infection. Damage to bladder nerves or pelvic muscles, constipation, and certain medicines can also lead to urge incontinence.  Treatment of urinary incontinence depends on the cause. Further evaluation is needed to find the type you have. This will likely include an exam and certain tests. Based on the results, you and your healthcare provider can then plan treatment. Until a diagnosis is made, the home care tips below can help relieve symptoms.  Home care    Do pelvic floor muscle exercises, if they are prescribed. The pelvic floor muscles help support the bladder and urethra. Many women find that their symptoms improve when doing special exercises that strengthen these muscles. To do the exercises contract the muscles you would use to stop your stream of urine,  but do this when you re not urinating. Hold for 10 seconds, then relax. Repeat 10 to 20 times in a row, at least 3 times a day. Your provider may give you other instructions for how to do the exercises and how often.    Keep a bladder diary. This helps track how often and how much you urinate over a set period of time. Bring this diary with you to your next visit with the provider. The information can help your provider learn more about your bladder problem.    Lose weight, if advised to by your provider. Excess weight puts pressure on the bladder. Your provider can help you create a weight-loss plan that s right for you. This may include exercising more and making certain diet changes.    Don't consume foods and drinks that may irritate the bladder. These can include alcohol and caffeinated drinks.    Quit smoking. Smoking and other tobacco use can lead to chronic cough that strains the pelvic floor muscles. Smoking may also damage the bladder and urethra. Talk with your provider about treatments or methods you can use to quit smoking.    If drinking large amounts of fluid causes you to have symptoms, you may be advised to limit your fluid intake. You may also be advised to drink most of your fluids during the day and to limit fluids at night.    If you re worried about urine leakage or accidents, you may wear absorbent pads to catch urine. Change the pads often. This helps reduce discomfort. It may also reduce the risk of skin or bladder infections.  Follow-up care  Follow up with your healthcare provider, or as directed. It may take some to find the right treatment for your problem. Your treatment plan may include special therapies or medicines. Certain procedures or surgery may also be options. Be sure to discuss any questions you have with your provider.  When to seek medical advice  Call the healthcare provider right away if any of these occur:    Fever of 100.4 F (38 C) or higher, or as directed by your  provider    Bladder pain or fullness    Abdominal swelling    Nausea or vomiting    Back pain    Weakness, dizziness or fainting  Date Last Reviewed: 10/1/2017    4486-8304 [employer]. [address] [place] [address], [place] [address]. All rights reserved. This information is not intended as a substitute for professional medical care. Always follow your healthcare professional's instructions.       Advance Directive  Patient s advance directive was discussed and I am comfortable with the patient s wishes.  Patient Education   Personalized Prevention Plan  You are due for the preventive services outlined below.  Your care team is available to assist you in scheduling these services.  If you have already completed any of these items, please share that information with your care team to update in your medical record.  Health Maintenance   Topic Date Due     MEDICARE ANNUAL WELLNESS VISIT  12/23/2020     FALL RISK ASSESSMENT  12/23/2020     DXA SCAN  01/11/2021     TD 18+ HE  11/01/2022     ADVANCE CARE PLANNING  11/17/2022     PNEUMOCOCCAL IMMUNIZATION 65+ LOW/MEDIUM RISK  Completed     INFLUENZA VACCINE RULE BASED  Completed     ZOSTER VACCINES  Completed      1.  Check mammogram every one or two years.    2..  Recheck bone density in one to two years    3. I will notify you of test results    4.  Pneumo-23 booster today    5.  See in one year or as needed    6. Same medications

## 2021-06-04 NOTE — TELEPHONE ENCOUNTER
Pts Care Provider: Mignon Monterroso    Caller: Tawny    Phone Number: [phone]  OK to leave message: Yes    Reason for Call: Pt called to cancel her HAC appointment and is just going to come in to return her hearing aids

## 2021-06-04 NOTE — TELEPHONE ENCOUNTER
Orders being requested:   UA/UC    Reason service is needed/diagnosis: Ongoing Urinary sx's    When are orders needed by: STAT    Where to send Orders: EPIC     Okay to leave detailed message?  Yes    Patient is requesting an lab order to be placed to come in today to rule out UTI.    Patient questions if prophylactic therapy is an option?    Please STAT request would like lab apt done today and notify patient of the outcome to this request.

## 2021-06-04 NOTE — TELEPHONE ENCOUNTER
RN cannot approve Refill Request    RN can NOT refill this medication PCP messaged that patient is overdue for Office Visit. Last office visit: Visit date not found Last Physical: 11/19/2018 Last MTM visit: Visit date not found Last visit same specialty: Visit date not found.  Next visit within 3 mo: Visit date not found  Next physical within 3 mo: Visit date not found      Anitha SAVAGE Gerardo, [facility] Triage/Med Refill 12/19/2019    Requested Prescriptions   Pending Prescriptions Disp Refills     simvastatin (ZOCOR) 40 MG tablet [Pharmacy Med Name: SIMVASTATIN 40MG TABLETS] 90 tablet 0     Sig: TAKE 1 TABLET BY MOUTH AT BEDTIME       Statins Refill Protocol (Hmg CoA Reductase Inhibitors) Failed - 12/17/2019 12:33 PM        Failed - PCP or prescribing provider visit in past 12 months      Last office visit with prescriber/PCP: Visit date not found OR same dept: Visit date not found OR same specialty: Visit date not found  Last physical: 11/19/2018 Last MTM visit: Visit date not found   Next visit within 3 mo: Visit date not found  Next physical within 3 mo: Visit date not found  Prescriber OR PCP: Benito Medina MD  Last diagnosis associated with med order: 1. Mixed hyperlipidemia  - simvastatin (ZOCOR) 40 MG tablet [Pharmacy Med Name: SIMVASTATIN 40MG TABLETS]; TAKE 1 TABLET BY MOUTH AT BEDTIME..  Dispense: 90 tablet; Refill: 0    If protocol passes may refill for 12 months if within 3 months of last provider visit (or a total of 15 months).

## 2021-06-04 NOTE — TELEPHONE ENCOUNTER
Contacted pt to get more information.  Pt reports urinary urgency. UA/UC order placed.  Lab appt scheduled.

## 2021-06-04 NOTE — TELEPHONE ENCOUNTER
New Appointment Needed  What is the reason for the visit:    physical  Provider Preference: PCP only  How soon do you need to be seen?: next week, not Tuesday.   States she was informed by her daughters doctor that daughter is dying and is not sure when the time will be so patient would like to have her physical done as soon possible before anything happens. Please advise.  Waitlist offered?: No  Okay to leave a detailed message:  Yes.

## 2021-06-04 NOTE — PROGRESS NOTES
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  No cognitive deficits are noted.  Her health risk assessment was reviewed.  She is independent in activities of daily living.  She does report regular exercise.  She has had a healthcare directive    1. Esophageal reflux  She is using the omeprazole every other day with good control of symptoms  - omeprazole (PRILOSEC) 20 MG capsule; TAKE 1 CAPSULE BY MOUTH DAILY AS NEEDED.  Dispense: 100 capsule; Refill: 3    2.  Essential hypertension  Blood pressure is under good control with metoprolol and losartan-HCTZ .  She denies adverse effects from this  - metoprolol succinate (TOPROL-XL) 50 MG 24 hr tablet; Take 1 tablet (50 mg total) by mouth daily.  Dispense: 90 tablet; Refill: 3  - Comprehensive Metabolic Panel    3. Mixed hyperlipidemia  She is on simvastatin 40 mg daily.  Lipids will be checked  - Lipid Cascade    4. Encounter for therapeutic drug monitoring  Monitoring labs are checked as outlined  - Comprehensive Metabolic Panel  - HM2(CBC w/o Differential)    6. Need for pneumococcal vaccination  Pneumo 23 booster is advised  - Pneumococcal polysaccharide vaccine 23-valent 3 yo or older, subq/IM    7.  Obesity with comorbidity:  Weight loss and regular exercise are encouraged    8.  Recurrent UTIs:  She currently is doing well.  She does take cranberry    Patient Instructions       Patient Education   Urinary Incontinence, Female (Adult)  Urinary incontinence means loss of control of the bladder. This problem affects many women, especially as they get older. If you have incontinence, you may be embarrassed to ask for help. But know that this problem can be treated.  Types of Incontinence  There are different types of incontinence. Two of the main types are described here. You can have more than one type.    Stress incontinence. With this type, urine leaks when pressure (stress) is put on the bladder. This may happen when you cough,  sneeze, or laugh. Stress incontinence most often occurs because the pelvic floor muscles that support the bladder and urethra are weak. This can happen after pregnancy and vaginal childbirth or a hysterectomy. It can also be due to excess body weight or hormone changes.    Urge incontinence (also called overactive bladder). With this type, a sudden urge to urinate is felt often. This may happen even though there may not be much urine in the bladder. The need to urinate often during the night is common. Urge incontinence most often occurs because of bladder spasms. This may be due to bladder irritation or infection. Damage to bladder nerves or pelvic muscles, constipation, and certain medicines can also lead to urge incontinence.  Treatment of urinary incontinence depends on the cause. Further evaluation is needed to find the type you have. This will likely include an exam and certain tests. Based on the results, you and your healthcare provider can then plan treatment. Until a diagnosis is made, the home care tips below can help relieve symptoms.  Home care    Do pelvic floor muscle exercises, if they are prescribed. The pelvic floor muscles help support the bladder and urethra. Many women find that their symptoms improve when doing special exercises that strengthen these muscles. To do the exercises contract the muscles you would use to stop your stream of urine, but do this when you re not urinating. Hold for 10 seconds, then relax. Repeat 10 to 20 times in a row, at least 3 times a day. Your provider may give you other instructions for how to do the exercises and how often.    Keep a bladder diary. This helps track how often and how much you urinate over a set period of time. Bring this diary with you to your next visit with the provider. The information can help your provider learn more about your bladder problem.    Lose weight, if advised to by your provider. Excess weight puts pressure on the bladder. Your  provider can help you create a weight-loss plan that s right for you. This may include exercising more and making certain diet changes.    Don't consume foods and drinks that may irritate the bladder. These can include alcohol and caffeinated drinks.    Quit smoking. Smoking and other tobacco use can lead to chronic cough that strains the pelvic floor muscles. Smoking may also damage the bladder and urethra. Talk with your provider about treatments or methods you can use to quit smoking.    If drinking large amounts of fluid causes you to have symptoms, you may be advised to limit your fluid intake. You may also be advised to drink most of your fluids during the day and to limit fluids at night.    If you re worried about urine leakage or accidents, you may wear absorbent pads to catch urine. Change the pads often. This helps reduce discomfort. It may also reduce the risk of skin or bladder infections.  Follow-up care  Follow up with your healthcare provider, or as directed. It may take some to find the right treatment for your problem. Your treatment plan may include special therapies or medicines. Certain procedures or surgery may also be options. Be sure to discuss any questions you have with your provider.  When to seek medical advice  Call the healthcare provider right away if any of these occur:    Fever of 100.4 F (38 C) or higher, or as directed by your provider    Bladder pain or fullness    Abdominal swelling    Nausea or vomiting    Back pain    Weakness, dizziness or fainting  Date Last Reviewed: 10/1/2017    7367-1352 [employer]. [address] [place] [address], [place] [address]. All rights reserved. This information is not intended as a substitute for professional medical care. Always follow your healthcare professional's instructions.       Advance Directive  Patient s advance directive was discussed and I am comfortable with the patient s wishes.  Patient Education   Personalized Prevention  Plan  You are due for the preventive services outlined below.  Your care team is available to assist you in scheduling these services.  If you have already completed any of these items, please share that information with your care team to update in your medical record.  Health Maintenance   Topic Date Due     MEDICARE ANNUAL WELLNESS VISIT  12/23/2020     FALL RISK ASSESSMENT  12/23/2020     DXA SCAN  01/11/2021     TD 18+ HE  11/01/2022     ADVANCE CARE PLANNING  11/17/2022     PNEUMOCOCCAL IMMUNIZATION 65+ LOW/MEDIUM RISK  Completed     INFLUENZA VACCINE RULE BASED  Completed     ZOSTER VACCINES  Completed      1.  Check mammogram every one or two years.    2..  Recheck bone density in one to two years    3. I will notify you of test results    4.  Pneumo-23 booster today    5.  See in one year or as needed    6. Same medications    7.  Her daughter currently is ill with terminal metastatic ovarian cancer.  Support was offered.      The patient's current medical problems were reviewed.    The following high BMI interventions were performed this visit: encouragement to exercise, weight monitoring and prescribed dietary intake  The following health maintenance schedule was reviewed with the patient and provided in printed form in the after visit summary:   Health Maintenance   Topic Date Due     MEDICARE ANNUAL WELLNESS VISIT  12/23/2020     FALL RISK ASSESSMENT  12/23/2020     DXA SCAN  01/11/2021     TD 18+ HE  11/01/2022     ADVANCE CARE PLANNING  11/17/2022     PNEUMOCOCCAL IMMUNIZATION 65+ LOW/MEDIUM RISK  Completed     INFLUENZA VACCINE RULE BASED  Completed     ZOSTER VACCINES  Completed        Subjective:   HPI: Chief Complaint: Tawny Santos is an 85 y.o. female here for an Annual Wellness visit. She notes she is doing well despite grieving for her daughter who is deteriorating from Ovarian Cancer. Tawny is compliant with medication and denies side affects. She notes she takes omeprazole for stomach  acid every other day with relief.     Health Maintenance: Mammo up to date. Pneumo 23 today. Shingrix completed. DXA up to date.  Review of Systems:    Denies medication side affect, and stomach irritation.   Please see above.  The rest of the review of systems are negative for all systems.    PFSH:  Tawny had 7 kids total but only 6 are living. A son passed 40 years ago in a car accident. Her daughter has been deteriorating from Ovarian cancer for two years and is at hospice.     Patient Care Team:  Benito Medina MD as PCP - General  Benito Medina MD as Assigned PCP     Patient Active Problem List   Diagnosis     Esophageal Reflux     Osteopenia     Narrow angle glaucoma of right eye     Essential hypertension     Osteoarthritis Of The Hand     Urinary Frequency     Macular Degeneration     Venous Insufficiency     Diverticulosis     Melanoma (H)     Pre-operative examination for internal medicine     Obesity (BMI 35.0-39.9) with comorbidity (H)     Past Medical History:   Diagnosis Date     Meningitis       Past Surgical History:   Procedure Laterality Date     BREAST BIOPSY Left      HYSTERECTOMY       WA APPENDECTOMY      Description: Appendectomy;  Recorded: 10/24/2008;     WA BIOPSY OF BREAST, INCISIONAL      Description: Biopsy Breast Open;  Recorded: 10/24/2008;     WA REVISE MEDIAN N/CARPAL TUNNEL SURG      Description: Neuroplasty Decompression Median Nerve At Carpal Tunnel;  Proc Date: 11/01/2013;     WA TOTAL ABDOM HYSTERECTOMY      Description: Hysterectomy;  Recorded: 10/24/2008;     WA TOTAL KNEE ARTHROPLASTY      Description: Total Knee Arthroplasty;  Recorded: 10/24/2008;     TENDON RELEASE      Thumbs (trigger finger)      Family History   Problem Relation Age of Onset     Colon cancer Mother      Stroke Father      Heart disease Father       Social History     Socioeconomic History     Marital status:      Spouse name: Not on file     Number of children: Not on file     Years of  education: Not on file     Highest education level: Not on file   Occupational History     Occupation: Retired   Social Needs     Financial resource strain: Not on file     Food insecurity:     Worry: Not on file     Inability: Not on file     Transportation needs:     Medical: Not on file     Non-medical: Not on file   Tobacco Use     Smoking status: Former Smoker     Smokeless tobacco: Never Used     Tobacco comment: Quit smoking around 1960   Substance and Sexual Activity     Alcohol use: No     Comment: pt stopped smoking around 1960      Drug use: No     Sexual activity: Not on file   Lifestyle     Physical activity:     Days per week: Not on file     Minutes per session: Not on file     Stress: Not on file   Relationships     Social connections:     Talks on phone: Not on file     Gets together: Not on file     Attends [employer] service: Not on file     Active member of club or organization: Not on file     Attends meetings of clubs or organizations: Not on file     Relationship status: Not on file     Intimate partner violence:     Fear of current or ex partner: Not on file     Emotionally abused: Not on file     Physically abused: Not on file     Forced sexual activity: Not on file   Other Topics Concern     Not on file   Social History Narrative    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. She travelled to [place] in 2003. She describes her daily diet as healthy. She participates in playing bridge at [employer] and family activities including going to the lake. She bikes about 15min 6x a week. She always wears a seatbelt while driving. There are not guns in her home.       Current Outpatient Medications   Medication Sig Dispense Refill     antiox#10-om3-dha-epa-lut-zeax (I-CAPS) 280-10-2 mg cap Take by mouth.       calcium-vitamin D (CALCIUM-VITAMIN D) 500 mg(1,250mg) -200 unit per tablet Take 1 tablet by mouth daily.       co-enzyme Q-10 30 mg capsule Take 30 mg by  "mouth daily.        cranberry 400 mg cap Take by mouth.       ibuprofen (ADVIL,MOTRIN) 200 MG tablet Take 600 mg by mouth every 6 (six) hours as needed for pain.       KRILL/OM-3/DHA/EPA/PHOSPHO/AST (MEGARED OMEGA-3 KRILL OIL ORAL) Take 300 mg by mouth daily.       losartan-hydrochlorothiazide (HYZAAR) 50-12.5 mg per tablet TAKE 1 TABLET BY MOUTH DAILY 90 tablet 3     metoprolol succinate (TOPROL-XL) 50 MG 24 hr tablet Take 1 tablet (50 mg total) by mouth daily. 90 tablet 3     multivitamin with minerals (THERA-M) 9 mg iron-400 mcg Tab tablet Take 1 tablet by mouth daily.       omeprazole (PRILOSEC) 20 MG capsule TAKE 1 CAPSULE BY MOUTH DAILY AS NEEDED. 100 capsule 3     psyllium (METAMUCIL) 0.52 gram capsule Take 0.52 g by mouth daily.       simvastatin (ZOCOR) 40 MG tablet TAKE 1 TABLET BY MOUTH AT BEDTIME 90 tablet 3     No current facility-administered medications for this visit.       Objective:   Vital Signs:   Visit Vitals  /74 (Patient Site: Left Arm, Patient Position: Sitting, Cuff Size: Adult Large)   Pulse 66   Ht 5' 1.75\" (1.568 m)   Wt 196 lb (88.9 kg)   SpO2 98%   BMI 36.14 kg/m         VisionScreening:  No exam data present     PHYSICAL EXAM  Constitutional:   Reveals alert, pleasant, moderately obese woman. Affect appropriate. Does not appear in acute distress.  Vitals: per nursing notes.  HEENT: Atraumatic.   Ears:  External canals, TMs clear.    Eyes: Aphakia right eye. EOMs full, PERRL.  Oropharynx:   Mouth and throat clear, no thrush or exudate.  Neck:  Supple, no carotid bruits or adenopathy.  Back:  No spine or CVA pain.  Thorax:  No bony deformities.  Lungs: Clear to A&P without rales or wheezes.  Respiratory effort normal.  Cardiac:   Regular rate and rhythm, normal S1, S2, no murmur or gallop.  Breasts:   No masses, no axillary adenopathy.  Abdomen:  Soft, moderately obese, active bowel sounds without bruits, mass, or tenderness. No inguinal hernias. Lower abdominal midline scar. " Femoral pulses in tact  :  Not done.  Extremities:   Pulses in the feet intact.  Scar over right knee consistent with right total arthoplasty.  1+ edema left ankle with lesser edema on right.  Skin:  No lesions to suggest malignancy.  Neuro:  Alert and oriented. Cranial nerves, motor, sensory exams are intact.  No gross focal deficits.  Psychiatric:  Memory intact, mood appropriate.      Assessment Results 12/23/2019   Activities of Daily Living No help needed   Instrumental Activities of Daily Living No help needed   Get Up and Go Score Less than 12 seconds   Mini Cog Total Score 5   Some recent data might be hidden     A Mini-Cog score of 0-2 suggests the possibility of dementia, score of 3-5 suggests no dementia    Identified Health Risks:     Information on urinary incontinence and treatment options given to patient.  Patient's advanced directive was discussed and I am comfortable with the patient's wishes.    ADDITIONAL HISTORY SUMMARIZED (2): None.  DECISION TO OBTAIN EXTRA INFORMATION (1): None.   RADIOLOGY TESTS (1): None.  LABS (1): Reviewed and ordered.  MEDICINE TESTS (1): None.  INDEPENDENT REVIEW (2 each): None.     The visit lasted a total of 19 minutes face to face with the patient. Over 50% of the time was spent counseling and educating the patient about annual wellness    IDre, am scribing for and in the presence of, Dr. Medina.    I, Dr. Benito Medina, personally performed the services described in this documentation, as scribed by Dre Pelletier in my presence, and it is both accurate and complete.    Total data points: 1

## 2021-06-05 VITALS
SYSTOLIC BLOOD PRESSURE: 120 MMHG | HEART RATE: 68 BPM | DIASTOLIC BLOOD PRESSURE: 74 MMHG | WEIGHT: 200 LBS | HEIGHT: 62 IN | BODY MASS INDEX: 36.8 KG/M2 | OXYGEN SATURATION: 98 %

## 2021-06-05 VITALS
SYSTOLIC BLOOD PRESSURE: 128 MMHG | WEIGHT: 185.3 LBS | HEART RATE: 66 BPM | DIASTOLIC BLOOD PRESSURE: 70 MMHG | BODY MASS INDEX: 34.1 KG/M2 | OXYGEN SATURATION: 99 % | HEIGHT: 62 IN

## 2021-06-05 NOTE — TELEPHONE ENCOUNTER
Pt Tawny Santos 5/12/1934 ph [phone] pt says she returned hearing aids abut keeps getting a bill for them

## 2021-06-07 NOTE — TELEPHONE ENCOUNTER
Drug Change Request  Who is calling?:  Patient  What is the current medication?:  losartan-hydrochlorothiazide (HYZAAR) 50-12.5 mg per tablet  What alternative is being requested?: The alternative that is being requested is Losartan be refilled, and HCTZ to be refilled.  Please advise.  Why the request to change?:  seperately due to unavailability.  Requested [facility]acy?: [facility]  Okay to leave a detailed message?:  No

## 2021-06-14 NOTE — TELEPHONE ENCOUNTER
RN cannot approve Refill Request    RN can NOT refill this medication PCP messaged that patient is overdue for Labs. Last office visit: Visit date not found Last Physical: 12/28/2020 Last MTM visit: Visit date not found Last visit same specialty: Visit date not found.  Next visit within 3 mo: Visit date not found  Next physical within 3 mo: Visit date not found      Erlinda Pineda, [facility] Triage/Med Refill 1/18/2021    Requested Prescriptions   Pending Prescriptions Disp Refills     metFORMIN (GLUCOPHAGE-XR) 500 MG 24 hr tablet [Pharmacy Med Name: METFORMIN ER 500MG 24HR TABS] 90 tablet 0     Sig: TAKE 1 TABLET(500 MG) BY MOUTH DAILY WITH BREAKFAST       Metformin Refill Protocol Failed - 1/18/2021  4:08 PM        Failed - Microalbumin in last year      No results found for: MICROALBUR               Passed - Blood pressure in last 12 months     BP Readings from Last 1 Encounters:   12/28/20 120/74             Passed - LFT or AST or ALT in last 12 months     Albumin   Date Value Ref Range Status   12/28/2020 4.1 3.5 - 5.0 g/dL Final     Bilirubin, Total   Date Value Ref Range Status   12/28/2020 0.7 0.0 - 1.0 mg/dL Final     Alkaline Phosphatase   Date Value Ref Range Status   12/28/2020 72 45 - 120 U/L Final     AST   Date Value Ref Range Status   12/28/2020 22 0 - 40 U/L Final     ALT   Date Value Ref Range Status   12/28/2020 28 0 - 45 U/L Final     Protein, Total   Date Value Ref Range Status   12/28/2020 7.2 6.0 - 8.0 g/dL Final                Passed - GFR or Serum Creatinine in last 6 months     GFR MDRD Non Af Amer   Date Value Ref Range Status   12/28/2020 >60 >60 mL/min/1.73m2 Final     GFR MDRD Af Amer   Date Value Ref Range Status   12/28/2020 >60 >60 mL/min/1.73m2 Final             Passed - Visit with PCP or prescribing provider visit in last 6 months or next 3 months     Last office visit with prescriber/PCP: Visit date not found OR same dept: Visit date not found OR same specialty: Visit  date not found Last physical: 12/28/2020 Last MTM visit: Visit date not found         Next appt within 3 mo: Visit date not found  Next physical within 3 mo: Visit date not found  Prescriber OR PCP: Benito Medina MD  Last diagnosis associated with med order: 1. Type 2 diabetes mellitus with hyperglycemia, without long-term current use of insulin (H)  - metFORMIN (GLUCOPHAGE-XR) 500 MG 24 hr tablet [Pharmacy Med Name: METFORMIN ER 500MG 24HR TABS]; TAKE 1 TABLET(500 MG) BY MOUTH DAILY WITH BREAKFAST  Dispense: 90 tablet; Refill: 0     If protocol passes may refill for 12 months if within 3 months of last provider visit (or a total of 15 months).           Passed - A1C in last 6 months     Hemoglobin A1c   Date Value Ref Range Status   01/18/2021 8.0 (H) <=5.6 % Final

## 2021-06-14 NOTE — TELEPHONE ENCOUNTER
Home blood sugar monitoring is helpful for maintaining good diabetic control, Rx for glucometer was signed

## 2021-06-14 NOTE — TELEPHONE ENCOUNTER
Central PA team  [phone]  Pool: [facility] ([id number])          PA has been initiated.       PA form completed and faxed insurance via Cover My Meds     Key:  [id number] - PA Case ID: [id number] - Rx #: [id number]     Medication:  Diclofenac Sodium 1% gel    Insurance:  [facility]        Response will be received via fax and may take up to 5-10 business days depending on plan

## 2021-06-14 NOTE — TELEPHONE ENCOUNTER
Reason for Call: Request for an order or referral:    Order or referral being requested: PT WAS SEEN BY [facility] AND THEY ORDERED FOR HER A GLUCOMETER AND MEDICARE WILL NOT COVER UNLESS A RX IS WRITTEN BY PCP (DR TRINIDAD)    PATIENT WONDERING IF THIS IS REALLY NEEDED??    PLEASE SEND ORDER:  [facility]  PHONE:  [phone]    Date needed: as soon as possible    Has the patient been seen by the PCP for this problem? YES    Additional comments: N/A    Phone number Patient can be reached at:  Home number on file [phone] (home)    Best Time:  ANYTIME    Can we leave a detailed message on this number?  Yes    Call taken on 2/1/2021 at 12:34 PM by Estefany Batres

## 2021-06-14 NOTE — PROGRESS NOTES
"Type of Service: Telephone Visit      Assessment: Tawny has new diagnosis of type 2 diabetes.   She is currently taking 500 mg XR metformin with breakfast;  she is tolerating it well.   Since diagnosis, she has been following the \"[place]\" diet for weight loss.   She states she is eating very little breads and no fruit.   She and her spouse live independently,  reports daughters are very helpful.   She is c/o having to cook two separate meals as she is trying to follow this diet.  She has a st bike that she uses for 10 minutes daily, but admits not getting regular walks in.        BF:  egg white, turkey oquendo, vegetables          L:  cheese stick, green olives             Dinner:  turkey burger, vegetable,      a few crackers at bedtime      Plan: Reviewed diabetes diagnosis, BG goals, nutrition, activity, and medication guidelines.  Alc goal < 8%.   Recommnend she chec FBS and occ one other 2 hour post meal reading at varying times.   Diabetes educational materials will be mailed.   Discussed short coming of [employer] diet and difficult to stay on.   Encouraged healthy carb choices in smaller portions;  also concerned that she is cooking separate meals.   Will order BG meter,  discussed training options.   CDE f/up in one month.  To call with any questions/concerns.     1)  check BG as noted above  2)  increase activity as able:  will send armchair activities   3) Follow carbohydrate recommendations      Subjective and Objective:      Tawny Santos is referred by Dr. Medina for Diabetes Education.     Lab Results   Component Value Date    HGBA1C 8.0 (H) 01/18/2021     Follow up:   Diabetes classes for one month      Education:     Monitoring   Meter (per above goals): assessment, discussed, pt returned demonstration,  Monitoring: assessment, discussed, pt returned demonstration, literature provided   BG goals: assessment, discussed, pt returned demonstration, literature provided      Nutrition " Management:  assessment, discussed, pt returned demo,  literature provided   Weight:assessment, discussed, pt returned demo,  literature provided   Portions/Balance: assessment, discussed, pt returned demo,  literature provided   Carb ID/Count: assessment, discussed, pt returned demo,  literature provided   Label Reading: assessment, discussed, pt returned demo,  literature provided   Heart Healthy Fats:assessment, discussed, pt returned demo,  literature provided   Menu Planning: assessment, discussed, pt returned demo,  literature provided   Dining Out: assessment, discussed,  literature provided   Physical Activity: assessment, discussed,  literature provided   Medications: assessment, discussed  Orals: assessment, discussed  Diabetes Disease Process: Assessed and Discussed    Acute Complications: Prevent, Detect, Treat:  Hypoglycemia: Discussed  Hyperglycemia: Assessed and Discussed  Sick Days: Literature provided and Needs instruction/review at follow-up    Chronic Complications  Foot Care: literature provided  Skin Care: Literature provided  Eye: Literature provided  ABC: Literature provided  Teeth:Discussed  Goal Setting and Problem Solving: Discussed  Barriers: Discussed  Psychosocial Adjustments: Discussed      Time spent with the patient: 60 minutes for diabetes education and counseling.   Previous Education: no  Visit Type:DSMT  Hours Remaining: DSMT 9 and MNT 3      No Zamora  1/25/2021     Diabetes Self-Management Education and Support: Initial Assessment Visit for Newly Diagnosed Patients    Tawny Santos presents today for education related to Type 2 diabetes.    Type 2  Previous EducationNo    Attending today's visit: patient    Patient's diabetes management related comments/concerns: surprised by diagnosis    Patient would like this visit to be focused around the following diabetes-related behaviors and goals: Healthy Eating      Diabetes knowledge and skills assessment:     Patient  "needs further education on the following diabetes management concepts: Healthy Eating and Monitoring    INTERVENTION:   Education provided today on:  AADE Self-Care Behaviors:  Diabetes Pathophysiology, Healthy Eating: consistency in amount, composition, and timing of food intake, Being Active: relationship to blood glucose and Monitoring: proper technique    Opportunities for ongoing education and support in diabetes-self management were discussed.    Pt verbalized understanding of concepts discussed and recommendations provided today.       Patient Problem List and Family Medical History reviewed for relevant medical history, current medical status, and diabetes risk factors.    Vitals: phone  There were no vitals taken for this visit.  Estimated body mass index is 36.88 kg/m  as calculated from the following:    Height as of 12/28/20: 5' 1.75\" (1.568 m).    Weight as of 12/28/20: 200 lb (90.7 kg).     BP Readings from Last 3 Encounters:   12/28/20 120/74   12/23/19 132/74   11/19/18 130/70       Social History     Tobacco Use   Smoking Status Former Smoker   Smokeless Tobacco Never Used   Tobacco Comment    Quit smoking around 1960         Labs:  No components found for: A1C  No components found for: GLC  No results found for: LDL  HDL Cholesterol   Date Value Ref Range Status   12/28/2020 36 (L) >=50 mg/dL Final   ]  No results found for: GFRESTIMATED  No results found for: GFRESTBLACK  No components found for: CR  No components found for: MICROALBUMIN    Healthy Eating:    Patient has the following eating practices:   smaller portions  has been following     Beverages: Water 3/day    Cultural/Restorationism diet restrictions: No    Being Active:      Types:  Bike - st      Monitoring:  will  meter and start,  referred to training videos  Taking Medication:    Taking diabetes medications? Yes    Current Outpatient Medications on File Prior to Visit   Medication Sig Dispense Refill     " antiox#10-om3-dha-epa-lut-zeax (I-CAPS) 280-10-2 mg cap Take by mouth.       calcium-vitamin D (CALCIUM-VITAMIN D) 500 mg(1,250mg) -200 unit per tablet Take 1 tablet by mouth daily.       co-enzyme Q-10 30 mg capsule Take 30 mg by mouth daily.        cranberry 400 mg cap Take by mouth.       diclofenac sodium (VOLTAREN) 1 % Gel Four gram four times daily as needed for joint pain lower extremities, two gm four times daily for small joints upper extremities. 100 g 6     ibuprofen (ADVIL,MOTRIN) 200 MG tablet Take 600 mg by mouth every 6 (six) hours as needed for pain.       KRILL/OM-3/DHA/EPA/PHOSPHO/AST (MEGARED OMEGA-3 KRILL OIL ORAL) Take 300 mg by mouth daily.       losartan-hydrochlorothiazide (HYZAAR) 50-12.5 mg per tablet TAKE 1 TABLET BY MOUTH DAILY 90 tablet 3     metFORMIN (GLUCOPHAGE-XR) 500 MG 24 hr tablet TAKE 1 TABLET(500 MG) BY MOUTH DAILY WITH BREAKFAST 90 tablet 3     metoprolol succinate (TOPROL-XL) 50 MG 24 hr tablet Take 1 tablet (50 mg total) by mouth daily. 90 tablet 3     multivitamin with minerals (THERA-M) 9 mg iron-400 mcg Tab tablet Take 1 tablet by mouth daily.       omeprazole (PRILOSEC) 20 MG capsule Take 1 capsule by mouth every other day. 45 capsule 3     psyllium (METAMUCIL) 0.52 gram capsule Take 0.52 g by mouth daily.       simvastatin (ZOCOR) 40 MG tablet Take 1 tablet (40 mg total) by mouth at bedtime. 90 tablet 3     No current facility-administered medications on file prior to visit.        Medication side effects? No    Missing medication doses? No    Problem Solving:    At risk for hypoglycemia? No  no problems noted.    Symptoms of hyperglycemia? none       Reducing Risks:    Recent health service and resource utilization related to diabetes (hyperglycemia, hypoglycemia, etc):   None      Diabetes Risk Factors:   family history and age over 45 years      Healthy Coping:     Patient's emotional response to diabetes: expresses readiness to learn    Do you have any difficulty  affording your medications or glucose monitoring supplies? No    Socio/Economic/Cultural Considerations:    Support system:  spouse/significant other

## 2021-06-14 NOTE — PROGRESS NOTES
Assessment and Plan:     Patient has been advised of split billing requirements and indicates understanding: No  1. 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  Her  no longer drives due to visual deficit.  She just gave her car to her granddaughter and will have family members aid with transportation in the future.  She scores 5 on mini cog, and has no cognitive deficits noted.  She reports regular exercise.  She is independent in activities of daily living aside from transportation.  She does have a healthcare directive    2. Esophageal reflux  She remains on omeprazole 20 mg every other day.  She does well on this regimen, but will note recurrent symptoms if she totally goes off of it.  - omeprazole (PRILOSEC) 20 MG capsule; Take 1 capsule by mouth every other day.  Dispense: 45 capsule; Refill: 3  - HM2(CBC w/o Differential)    3. 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  She sees Dr. Wilson for regular follow-up    4. Mixed hyperlipidemia  She is on atorvastatin 40 mg daily.  Fasting lipids will be checked  - Lipid Cascade  - Custom LDL Cholesterol, Direct    5. Osteopenia  Lowest T score was -1.9 in the spine with bone densities in the normal range in the hips when checked in January 2019.  Recheck of bone density next year is recommended    6. Obesity (BMI 35.0-39.9) with comorbidity (H)  Weight loss and regular exercise are encouraged    7. Essential hypertension, benign  Blood pressure is in the desired range on current regimen.  She denies adverse effects from current medications  - losartan-hydrochlorothiazide (HYZAAR) 50-12.5 mg per tablet; TAKE 1 TABLET BY MOUTH DAILY  Dispense: 90 tablet; Refill: 3  - metoprolol succinate (TOPROL-XL) 50 MG 24 hr tablet; Take 1 tablet (50 mg total) by mouth daily.  Dispense: 90 tablet; Refill: 3  -  Comprehensive Metabolic Panel  - Urinalysis-UC if Indicated  - Culture, Urine    8. Primary osteoarthritis involving multiple joints  She notes joint pains particularly in the hands.  She has been using ibuprofen on a daily basis.  Given her history of hypertension and esophageal reflux, use of a topical NSAID would be safer if it is effective.  She was advised to try diclofenac gel.  - diclofenac sodium (VOLTAREN) 1 % Gel; Four gram four times daily as needed for joint pain lower extremities, two gm four times daily for small joints upper extremities.  Dispense: 100 g; Refill: 6    9. Vitamin D deficiency  Vitamin D level will be rechecked  - Vitamin D, Total (25-Hydroxy)    Plan:  1, labs as outlined.  She will be notified of test results    2.  She reports that she had an influenza vaccine.  Covid vaccine is advised when available.    3.  The pros and cons of checking mammograms at age 86 were reviewed.  She prefers to wait on this    4.  Work on weight loss and exercise.    5.  Recheck bone density next year    6.  The patient's current medical problems were reviewed.    The following high BMI interventions were performed this visit: encouragement to exercise, weight monitoring and prescribed dietary intake  The following health maintenance schedule was reviewed with the patient and provided in printed form in the after visit summary:   Health Maintenance   Topic Date Due     MEDICARE ANNUAL WELLNESS VISIT  12/28/2021     FALL RISK ASSESSMENT  12/28/2021     TD 18+ HE  11/01/2022     ADVANCE CARE PLANNING  12/23/2024     Pneumococcal Vaccine: Pediatrics (0 to 5 Years) and At-Risk Patients (6 to 64 Years)  Completed     Pneumococcal Vaccine: 65+ Years  Completed     INFLUENZA VACCINE RULE BASED  Completed     ZOSTER VACCINES  Completed     HEPATITIS B VACCINES  Aged Out        Subjective:   Chief Complaint: Tawny Santos is an 86 y.o. female here for an Annual Wellness visit.   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  Tawny reports that she just gave her car to her granddaughter.  Her  no longer drives.  She states children will help her with shopping and activities outside the home.  She scores 5 on mini cog, and has no cognitive deficits noted.  Her health risk assessment was reviewed.  She is independent in activities of daily living, and overall reports good health maintenance habits.  She does have a healthcare directive.      She has a history of multiple skin cancers including lentigo maligna.  She sees Dr. Wilson on a regular basis.    Blood pressure is good on current regimen.  Please see  assessment and plan for other medical issues    She has had some right eye irritation.  She had emergency surgery by Dr. Wilhelm years ago for narrow angle glaucoma.  She has not noted decreased vision.  Ophthalmology follow-up is advised if symptoms persist        Review of Systems:   Please see above.  The rest of the review of systems are negative for all systems.    Patient Care Team:  Benito Medina MD as PCP - General  Benito Medina MD as Assigned PCP  Mey Denton MD as Assigned Surgical Provider   Avelino Wilson MD dermatology  Patient Active Problem List   Diagnosis     Esophageal Reflux     Osteopenia     Narrow angle glaucoma of right eye     Essential hypertension     Osteoarthritis Of The Hand     Urinary Frequency     Macular Degeneration     Venous Insufficiency     Diverticulosis     Melanoma (H)     Pre-operative examination for internal medicine     Obesity (BMI 35.0-39.9) with comorbidity (H)     Past Medical History:   Diagnosis Date     Meningitis       Past Surgical History:   Procedure Laterality Date     BREAST BIOPSY Left      HYSTERECTOMY       OK APPENDECTOMY      Description: Appendectomy;  Recorded: 10/24/2008;     OK BIOPSY OF BREAST, INCISIONAL      Description: Biopsy Breast Open;  Recorded: 10/24/2008;     OK REVISE MEDIAN  N/CARPAL TUNNEL SURG      Description: Neuroplasty Decompression Median Nerve At Carpal Tunnel;  Proc Date: 11/01/2013;     FL TOTAL ABDOM HYSTERECTOMY      Description: Hysterectomy;  Recorded: 10/24/2008;     FL TOTAL KNEE ARTHROPLASTY      Description: Total Knee Arthroplasty;  Recorded: 10/24/2008;     TENDON RELEASE      Thumbs (trigger finger)      Family History   Problem Relation Age of Onset     Colon cancer Mother      Stroke Father      Heart disease Father       Social History     Socioeconomic History     Marital status:      Spouse name: Not on file     Number of children: Not on file     Years of education: Not on file     Highest education level: Not on file   Occupational History     Occupation: Retired   Social Needs     Financial resource strain: Not on file     Food insecurity     Worry: Not on file     Inability: Not on file     Transportation needs     Medical: Not on file     Non-medical: Not on file   Tobacco Use     Smoking status: Former Smoker     Smokeless tobacco: Never Used     Tobacco comment: Quit smoking around 1960   Substance and Sexual Activity     Alcohol use: No     Comment: pt stopped smoking around 1960      Drug use: No     Sexual activity: Not on file   Lifestyle     Physical activity     Days per week: Not on file     Minutes per session: Not on file     Stress: Not on file   Relationships     Social connections     Talks on phone: Not on file     Gets together: Not on file     Attends Jain service: Not on file     Active member of club or organization: Not on file     Attends meetings of clubs or organizations: Not on file     Relationship status: Not on file     Intimate partner violence     Fear of current or ex partner: Not on file     Emotionally abused: Not on file     Physically abused: Not on file     Forced sexual activity: Not on file   Other Topics Concern     Not on file   Social History Narrative    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 She travelled to [place] in 2003. She describes her daily diet as healthy. She participates in playing bridge at [employer] and family activities including going to the lake. She bikes about 15min 6x a week. She always wears a seatbelt while driving. There are not guns in her home.       Current Outpatient Medications   Medication Sig Dispense Refill     antiox#10-om3-dha-epa-lut-zeax (I-CAPS) 280-10-2 mg cap Take by mouth.       calcium-vitamin D (CALCIUM-VITAMIN D) 500 mg(1,250mg) -200 unit per tablet Take 1 tablet by mouth daily.       co-enzyme Q-10 30 mg capsule Take 30 mg by mouth daily.        cranberry 400 mg cap Take by mouth.       ibuprofen (ADVIL,MOTRIN) 200 MG tablet Take 600 mg by mouth every 6 (six) hours as needed for pain.       KRILL/OM-3/DHA/EPA/PHOSPHO/AST (MEGARED OMEGA-3 KRILL OIL ORAL) Take 300 mg by mouth daily.       losartan-hydrochlorothiazide (HYZAAR) 50-12.5 mg per tablet TAKE 1 TABLET BY MOUTH DAILY 90 tablet 3     metoprolol succinate (TOPROL-XL) 50 MG 24 hr tablet Take 1 tablet (50 mg total) by mouth daily. 90 tablet 3     multivitamin with minerals (THERA-M) 9 mg iron-400 mcg Tab tablet Take 1 tablet by mouth daily.       omeprazole (PRILOSEC) 20 MG capsule Take 1 capsule by mouth every other day. 45 capsule 3     psyllium (METAMUCIL) 0.52 gram capsule Take 0.52 g by mouth daily.       simvastatin (ZOCOR) 40 MG tablet Take 1 tablet (40 mg total) by mouth at bedtime. 90 tablet 3     diclofenac sodium (VOLTAREN) 1 % Gel Four gram four times daily as needed for joint pain lower extremities, two gm four times daily for small joints upper extremities. 100 g 6     No current facility-administered medications for this visit.       Objective:   Vital Signs:   Visit Vitals  /74 (Patient Site: Left Arm, Patient Position: Sitting, Cuff Size: Adult Regular)   Pulse 68   Ht 5' 1.75\" (1.568 m)   Wt 200 lb (90.7 kg)   SpO2 98%   BMI 36.88 kg/m     " "      VisionScreening:  No exam data present     PHYSICAL EXAM  /74 (Patient Site: Left Arm, Patient Position: Sitting, Cuff Size: Adult Regular)   Pulse 68   Ht 5' 1.75\" (1.568 m)   Wt 200 lb (90.7 kg)   SpO2 98%   BMI 36.88 kg/m      General Appearance:  Alert, cooperative, no distress, appears stated age   Head:  Normocephalic, without obvious abnormality, atraumatic   Eyes:   No conjunctival erythema.  EOMs full.  aphakia right eye.   Ears:  Normal TM's and external ear canals, both ears   Nose: Nares normal, septum midline,mucosa normal, no drainage    Throat: Lips, mucosa, and tongue normal; teeth and gums normal   Neck: Supple, symmetrical, trachea midline, no adenopathy;  thyroid: not enlarged, symmetric, no tenderness/mass/nodules; no carotid bruit or JVD   Back:   Symmetric, no curvature, ROM normal, no CVA tenderness   Lungs:   Clear to auscultation bilaterally, respirations unlabored   Breasts:  No breast masses, tenderness, asymmetry, or nipple discharge.   Heart:  Regular rate and rhythm, S1 and S2 normal, no murmur, rub, or gallop   Abdomen:   Soft, non-tender, bowel sounds active all four quadrants,  no masses, no organomegaly.  Moderately obese   Genitalia:  Exam deferred.   Pelvic: Deferred   Extremities: Extremities normal, atraumatic, no cyanosis, trace ankle edema.  Pulses intact   Skin: Skin color, texture, turgor normal, no rashes or lesions   Lymph nodes: Cervical, supraclavicular, and axillary nodes normal   Neurologic: No dysarthria or aphasia.  Cranial nerves, motor and sensory exams intact with symmetric DTRs         Assessment Results 12/28/2020   Activities of Daily Living 1 - Full function   Instrumental Activities of Daily Living 1 - Full function   Get Up and Go Score Less than 12 seconds   Mini Cog Total Score 5   Some recent data might be hidden     A Mini-Cog score of 0-2 suggests the possibility of dementia, score of 3-5 suggests no dementia    Identified Health Risks: "     The patient was counseled and encouraged to consider modifying their diet and eating habits. She was provided with information on recommended healthy diet options.  The patient reports that she has difficulty with instrumental activities of daily living. She has multiple children who live by her who assist with shopping and transportation  Information on urinary incontinence and treatment options given to patient.  Patient's advanced directive was discussed and I am comfortable with the patient's wishes.

## 2021-06-14 NOTE — PROGRESS NOTES
Assessment and Plan:     Assessment  1.  Annual wellness assessment:  She scored 4 on mini cog.  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  She is independent in activities of daily living.  She had a healthcare directive filed in 2007 which was reviewed and discussed    2.  Hypertension: Good control on current regimen.  She was switched from a atenolol to metoprolol XL due to difficulties with supply    3.  Low bone mass: Bone density study from 2015 was reviewed.  This should be rechecked in 1-2 years    4.  History of resected melanoma: She was encouraged to continue regular dermatology follow-up    5.  Hyperlipidemia: She tolerates simvastatin without adverse effects.  Fasting lipids will be checked.    6.  History of narrow angle glaucoma right eye: She has regular ophthalmology follow-up and is doing well    7.  Obesity: Weight loss and regular exercise were encouraged    Plan  1.  Labs as outlined.  She will be notified of test results.  2.  Work on exercise and weight loss  3.  Mammogram was advised.  She is not sure if she wishes this every year or every other year  4.  Recheck bone density in 1-2 years  5.  See in 1 year or as needed    The patient's current medical problems were reviewed.    The following health maintenance schedule was reviewed with the patient and provided in printed form in the after visit summary:   Health Maintenance   Topic Date Due     DXA SCAN  05/05/2017     FALL RISK ASSESSMENT  11/18/2017     ADVANCE DIRECTIVES DISCUSSED WITH PATIENT  11/19/2020     TD 18+ HE  11/01/2022     PNEUMOCOCCAL POLYSACCHARIDE VACCINE AGE 65 AND OVER  Completed     INFLUENZA VACCINE RULE BASED  Completed     PNEUMOCOCCAL CONJUGATE VACCINE FOR ADULTS (PCV13 OR PREVNAR)  Completed     ZOSTER VACCINE  Completed        Subjective:   Chief Complaint: Tawny Santos is an 83 y.o. female here for an Annual Wellness visit. She notes that she is generally doing well.    HPI:     Arthritis: Her  arthritis is stable at this time. Her last DXA was completed in 2015, which revealed low bone mass based on a low t-score of -1.8 at L1-L4 of the AP spine.    Hypertension: She is no longer taking atenolol 50 mg, but is taking metoprolol 50 mg daily. Her blood pressure today is 122/70.    Esophageal Reflux: She takes omeprazole approximately every other day for her esophageal reflux and her stomach has felt fine.     Hx Melanoma: She had a precursor of melanoma on the right side of her cheek that was removed and has healed well. She had a different 0-1 melanoma that was removed under her left eye.    Health Maintenance: Her last colonoscopy was completed on 10/17/2011. She is due for a mammogram this year, she is debating about getting one. She has received her influenza vaccination this year.    Review of Systems:  She had left heel pain for about two months, which has resolved. She denies any vision problems.  Please see above.  The rest of the review of systems are negative for all systems.    PFSH:  Her 52-year-old daughter has fallopian ovarian cancer. She has been on chemotherapy since April up until a couple of weeks ago. At this time, she is cancer-free, but not feeling very well. Reviewed as below.    Surgical: Hysterectomy, but ovaries are still there.      Patient Care Team:  Benito Medina MD as PCP - General   Dr. Jose Enrique Wilhelm-ophthalmology  Dr. Wilson dermatology  Brent Motta MD orthopedics  Joaquin Ontiveros MD  ENT  Patient Active Problem List   Diagnosis     Esophageal Reflux     Osteopenia     Glaucoma     Tuberculin PPD Induration Positive Interpretation     Hyperlipidemia     Hypertension     Osteoarthritis Of The Hand     Urinary Frequency     Macular Degeneration     Venous Insufficiency     Diverticulosis     Melanoma     Pre-operative examination for internal medicine     Past Medical History:   Diagnosis Date     Meningitis       Past Surgical History:   Procedure Laterality Date     BREAST  BIOPSY Left      HYSTERECTOMY       MD APPENDECTOMY      Description: Appendectomy;  Recorded: 10/24/2008;     MD BIOPSY OF BREAST, INCISIONAL      Description: Biopsy Breast Open;  Recorded: 10/24/2008;     MD REVISE MEDIAN N/CARPAL TUNNEL SURG      Description: Neuroplasty Decompression Median Nerve At Carpal Tunnel;  Proc Date: 11/01/2013;     MD TOTAL ABDOM HYSTERECTOMY      Description: Hysterectomy;  Recorded: 10/24/2008;     MD TOTAL KNEE ARTHROPLASTY      Description: Total Knee Arthroplasty;  Recorded: 10/24/2008;     TENDON RELEASE      Thumbs (trigger finger)      Family History   Problem Relation Age of Onset     Colon cancer Mother      Stroke Father      Heart disease Father       Social History     Social History     Marital status:      Spouse name: N/A     Number of children: N/A     Years of education: N/A     Occupational History     Retired      Social History Main Topics     Smoking status: Former Smoker     Smokeless tobacco: Not on file      Comment: Quit smoking around 1960     Alcohol use No      Comment: pt stopped smoking around 1960      Drug use: No     Sexual activity: Not on file     Other Topics Concern     Not on file     Social History Narrative    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 She travelled to [place] in 2003. She describes her daily diet as healthy. She participates in playing bridge at [place] and family activities including going to the lake. She bikes about 15min 6x a week. She always wears a seatbelt while driving. There are not guns in her home.       Current Outpatient Prescriptions   Medication Sig Dispense Refill     antiox#10-om3-dha-epa-lut-zeax (I-CAPS) 280-10-2 mg cap Take by mouth.       calcium-vitamin D (CALCIUM-VITAMIN D) 500 mg(1,250mg) -200 unit per tablet Take 1 tablet by mouth daily.       co-enzyme Q-10 30 mg capsule Take 30 mg by mouth daily.        cranberry 400 mg cap Take by mouth.        "ibuprofen (ADVIL,MOTRIN) 200 MG tablet Take 600 mg by mouth every 6 (six) hours as needed for pain.       KRILL/OM-3/DHA/EPA/PHOSPHO/AST (MEGARED OMEGA-3 KRILL OIL ORAL) Take 300 mg by mouth daily.       losartan-hydrochlorothiazide (HYZAAR) 50-12.5 mg per tablet TAKE ONE TABLET BY MOUTH DAILY 90 tablet 3     metoprolol succinate (TOPROL XL) 50 MG 24 hr tablet Take 1 tablet (50 mg total) by mouth daily. 90 tablet 3     multivitamin with minerals (THERA-M) 9 mg iron-400 mcg Tab tablet Take 1 tablet by mouth daily.       omeprazole (PRILOSEC) 20 MG capsule TAKE 1 CAPSULE BY MOUTH DAILY AS NEEDED. 100 capsule 3     psyllium (METAMUCIL) 0.52 gram capsule Take 0.52 g by mouth daily.       simvastatin (ZOCOR) 40 MG tablet TAKE 1 TABLET BY MOUTH AT BEDTIME. 90 tablet 3     No current facility-administered medications for this visit.       Objective:   Vital Signs:   Visit Vitals     /70     Pulse 70     Ht 5' 2\" (1.575 m)     Wt 197 lb (89.4 kg)     BMI 36.03 kg/m2        VisionScreening:  No exam data present     PHYSICAL EXAM  Constitutional:   Reveals an alert, talkative, moderately obese woman. Affect appropriate. Does not appear acutely ill.  Vitals: per nursing notes.  HEENT:  Ears:  External canals, TMs clear.    Eyes: Right eye aphakia, left pupil round and reactive  Oropharynx:   Mouth and throat clear, no thrush or exudate.  Neck:  Supple, no carotid bruits or adenopathy.  Back:  No spine or CVA pain.  Thorax:  No bony deformities.  Lungs: Clear to A&P without rales or wheezes.  Respiratory effort normal.  Cardiac:   Regular rate and rhythm, normal S1, S2, no murmur or gallop.  Breasts:   No masses, no axillary adenopathy.  Abdomen:  Moderately obese. Lower abdominal midline scar. Soft, active bowel sounds without bruits, mass, or tenderness. Femoral pulses intact. No hernias.  Extremities:  Scar over right knee consistent with total knee replacement. No peripheral edema, pulses in the feet intact. Moderate " varicosities lower legs.  Skin: Scattered seborrheid keratosis. No lesions to suggest malignancy.  Neuro:  Alert and oriented. Cranial nerves, motor, sensory exams are intact.  No gross focal deficits.  Psychiatric:  Memory intact, mood appropriate.      Assessment Results 11/17/2017   Activities of Daily Living No help needed   Instrumental Activities of Daily Living No help needed   Get Up and Go Score Less than 12 seconds   Mini Cog Total Score 4   Some recent data might be hidden     A Mini-Cog score of 0-2 suggests the possibility of dementia, score of 3-5 suggests no dementia    Identified Health Risks:     Patient's advanced directive was discussed and I am comfortable with the patient's wishes.    ADDITIONAL HISTORY SUMMARIZED (2): [facility] 6/26/17 pathology report reviewed.  DECISION TO OBTAIN EXTRA INFORMATION (1): None.   RADIOLOGY TESTS (1): DXA 4/30/2005 reviewed, low t-score of -1.8 at L1-L4 of the AP spine.  LABS (1): Labs from 11/18/16 reviewed.  MEDICINE TESTS (1): None.  INDEPENDENT REVIEW (2 each): None.       The visit lasted a total of 20 minutes face to face with the patient. Over 50% of the time was spent counseling and educating the patient about arthritis, history of melanoma, and general health maintenance.    I, Ros Hudson, am scribing for and in the presence of, Dr. Medina.    I, Dr. LUBNA Medina, personally performed the services described in this documentation, as scribed by Ros Hudson in my presence, and it is both accurate and complete.    Dragon dictation was used for this note. Speech recognition errors are a possibility.    Total Data Points: 4

## 2021-06-14 NOTE — TELEPHONE ENCOUNTER
[facility] is calling that they can not fill testing supplies for pt today and pt states that sheneeds them today    Pt is requesting to be [facility] [address] - teed up request for PCP  - please sign     Please reach out to pt when this has been completed to inform

## 2021-06-14 NOTE — TELEPHONE ENCOUNTER
Prior Authorization Request  Who s requesting:  Pharmacy  Pharmacy Name and Location: [id number]  Medication Name: Diclofenac Sodium 1%  Insurance Plan: [employer] MEDICARERXADVJESSICA [facility]  Insurance Member ID Number:  [id number]  CoverMyMeds Key: [id number]  Informed patient that prior authorizations can take up to 10 business days for response:   NA  Okay to leave a detailed message: No

## 2021-06-14 NOTE — TELEPHONE ENCOUNTER
Hemoglobin A1c is 8.0.  This and her last blood sugar are diagnostic of Type 2 diabetes.  She will start metformin  mg daily.  DM education is requested.  See in three months.  Recheck hemoglobin A1c then.  I have talked to Tawny.

## 2021-06-16 PROBLEM — E66.01 MORBID OBESITY (H): Status: ACTIVE | Noted: 2018-11-19

## 2021-06-16 PROBLEM — E11.65 TYPE 2 DIABETES MELLITUS WITH HYPERGLYCEMIA, WITHOUT LONG-TERM CURRENT USE OF INSULIN (H): Status: ACTIVE | Noted: 2021-01-18

## 2021-06-16 NOTE — TELEPHONE ENCOUNTER
Telephone Encounter by Bhanu Stoll RN at 11/23/2019  3:41 PM     Author: Bhanu Stoll RN Service: -- Author Type: Registered Nurse    Filed: 11/23/2019  4:09 PM Encounter Date: 11/23/2019 Status: Addendum    : Bhanu Stoll RN (Registered Nurse)    Related Notes: Original Note by Bhanu Stoll RN (Registered Nurse) filed at 11/23/2019  4:04 PM       Pt is calling in about UTI she has had since the end of October. Pt just finished Cephalexin on 11/11, then had a UA /UC on 11/13/2019, and was again put on Cephalexin by her PCP for 5 days while waiting culture.    Benito Medina MD           11/13/19 2:18 PM   Note      Urine analysis shows pyuria.  She will be started on cephalexin 500 mg 3 times daily for 5 days while awaiting urine culture results.  Please notify Tawny of this plan.        Pt is now out of Cephalexin, and symptoms of frequency and burning continue. Pt denies any fever or vomiting.   Home care measures discussed, and per protocol pt should be evaluated by a physician within 24 hours. Pt prefers to have a prescription called in by her physician, if possible. On call physician (Dr. Ross) was called. Received orders for Cipro 250mg. two times a day for 7 days. If after antibiotic treatment, symptoms do not improve, pt will need to follow up in the clinic. Pt agrees with plan, and was advised to call back if symptoms worsen.     Bhanu Stoll RN [facility] Triage/Medication Refill      Reason for Disposition  ? [1] Taking antibiotic > 72 hours (3 days) for UTI AND [2] painful urination or frequency not improved    Protocols used: URINARY TRACT INFECTION ON ANTIBIOTIC FOLLOW-UP CALL - FEMALE-HUSSEIN-

## 2021-06-17 NOTE — PROGRESS NOTES
ASSESSMENT:  1.  Type 2 diabetes mellitus:   Tawny is on Metformin XR.  She now checks blood sugars 3 times weekly.  Control overall has been quite good.  She denies diarrhea or other adverse effects from Metformin.  She has been going to diabetic education and watching diet    2.  Obesity with comorbidity:  Weight is down 15 pounds since December.  She was commended for this.  She reports she has tried several diets including Helen Sherman and [place] she had difficulty tolerating a keto diet.    3.  Essential hypertension:  On losartan-HCTZ 50/12.5 mg daily along with metoprolol XL 50 mg daily.  Blood pressure has been good on this regimen    4.  Mixed hyperlipidemia:  On simvastatin 40 mg daily.  A nonfasting lipid cascade will be checked    PLAN:  1.  Labs as outlined.  She will be notified of test results    2.  She was  commended for weight loss, and encouraged to keep working on it    3.  Start aspirin 81 mg daily due to the diagnosis of type 2 diabetes and heightened cardiovascular risk.    4 see in 3 to 4 months or as needed.      Orders Placed This Encounter   Procedures     Basic Metabolic Panel     Glycosylated Hemoglobin A1c     Lipid Cascade     Order Specific Question:   Fasting is required?     Answer:   Unknown     There are no discontinued medications.    Return in about 3 months (around 7/30/2021) for Recheck.    ASSESSED PROBLEMS:  1. Type 2 diabetes mellitus with hyperglycemia, without long-term current use of insulin (H)  Basic Metabolic Panel    Glycosylated Hemoglobin A1c    aspirin 81 MG EC tablet   2. Mixed hyperlipidemia  Lipid Cascade   3. Obesity (BMI 35.0-39.9) with comorbidity (H)     4. Essential hypertension         CHIEF COMPLAINT:  Chief Complaint   Patient presents with     Follow-up       HISTORY OF PRESENT ILLNESS:  Tawny is a 86 y.o. female seen for follow-up of type 2 diabetes.  She reports she has tried multiple diets since she was last seen.  Weight is down 15 pounds  "by clinic readings.  She reports weight at home is down 20 pounds.  She has tried various diets including Helen Sherman, [facility], and keto diet.    She is on Metformin XR.  She checks blood sugars 3 times weekly.  Readings generally have been good.    Blood pressure remains good on metoprolol XL, losartan with HCTZ.      She is on simvastatin 40 mg daily.  Nonfasting lipids will be checked today        REVIEW OF SYSTEMS:    Comprehensive review of systems is negative.    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  TOBACCO USE:  Social History     Tobacco Use   Smoking Status Former Smoker   Smokeless Tobacco Never Used   Tobacco Comment    Quit smoking around 1960       VITALS:  Vitals:    04/30/21 1147   BP: 128/70   Patient Site: Left Arm   Patient Position: Sitting   Cuff Size: Adult Regular   Pulse: 66   SpO2: 99%   Weight: 185 lb 4.8 oz (84.1 kg)   Height: 5' 1.75\" (1.568 m)     Wt Readings from Last 3 Encounters:   04/30/21 185 lb 4.8 oz (84.1 kg)   12/28/20 200 lb (90.7 kg)   12/23/19 196 lb (88.9 kg)       PHYSICAL EXAM:  Constitutional:   Reveals a alert pleasant healthy-appearing,  female.  Vitals: per nursing notes.  HEENT: Atraumatic  Eyes: Aphakia right eye  Oropharynx:   Mouth and throat clear, no thrush or exudate.  Neck:  Supple, no carotid bruits or adenopathy.  Back:  No spine or CVA pain.  Thorax:  No bony deformities.  Lungs: Clear to A&P without rales or wheezes.  Respiratory effort normal.  Cardiac:   Regular rate and rhythm, normal S1, S2, no murmur or gallop.  Abdomen:  Soft, active bowel sounds without bruits, mass, or tenderness.  Extremities:   No peripheral edema, pulses in the feet intact.    Skin:  No jaundice, peripheral cyanosis or lesions to suggest malignancy.  Neuro:  Alert and oriented.   No gross focal deficits.  Psychiatric:  Memory intact, mood appropriate.    DATA REVIEWED:  Additional History from Old Records Summarized (2):  chart including diabetic education visits were " reviewed  Decision to Obtain Records (1): None.   Radiology Tests Summarized or Ordered (1): None.  Labs Reviewed or Ordered (1):  labs reviewed and ordered  Medicine Test Summarized or Ordered (1): None.   Independent Review of EKG or X-RAY(2 each): None.    The visit lasted a total of 25 minutes face to face with the patient. Over 50% of the time was spent counseling and educating the patient about management of type 2 diabetes.    Dragon dictation was used for this note. Speech recognition errors are a possibility.     MEDICATIONS:  Current Outpatient Medications   Medication Sig Dispense Refill     antiox#10-om3-dha-epa-lut-zeax (I-CAPS) 280-10-2 mg cap Take by mouth.       blood glucose meter (GLUCOMETER) Use 1 each As Directed daily. Dispense glucometer brand per patient's insurance at pharmacy discretion. 1 each 0     blood glucose test (CONTOUR NEXT TEST STRIPS) strips Use 1 each As Directed daily. Dispense brand per patient's insurance at pharmacy discretion. 50 strip 3     blood-glucose meter (CONTOUR NEXT METER) Misc test BG once daily, following manufacturers instructions 1 each 0     calcium-vitamin D (CALCIUM-VITAMIN D) 500 mg(1,250mg) -200 unit per tablet Take 1 tablet by mouth daily.       co-enzyme Q-10 30 mg capsule Take 30 mg by mouth daily.        cranberry 400 mg cap Take by mouth.       diclofenac sodium (VOLTAREN) 1 % Gel Four gram four times daily as needed for joint pain lower extremities, two gm four times daily for small joints upper extremities. 100 g 6     generic lancets (FINGERSTIX LANCETS) Dispense brand per patient's insurance at pharmacy discretion.  Check Bg once daily. 100 each 2     ibuprofen (ADVIL,MOTRIN) 200 MG tablet Take 600 mg by mouth every 6 (six) hours as needed for pain.       KRILL/OM-3/DHA/EPA/PHOSPHO/AST (MEGARED OMEGA-3 KRILL OIL ORAL) Take 300 mg by mouth daily.       losartan-hydrochlorothiazide (HYZAAR) 50-12.5 mg per tablet TAKE 1 TABLET BY MOUTH DAILY 90 tablet  3     metFORMIN (GLUCOPHAGE-XR) 500 MG 24 hr tablet TAKE 1 TABLET(500 MG) BY MOUTH DAILY WITH BREAKFAST 90 tablet 3     metoprolol succinate (TOPROL-XL) 50 MG 24 hr tablet Take 1 tablet (50 mg total) by mouth daily. 90 tablet 3     multivitamin with minerals (THERA-M) 9 mg iron-400 mcg Tab tablet Take 1 tablet by mouth daily.       omeprazole (PRILOSEC) 20 MG capsule Take 1 capsule by mouth every other day. 45 capsule 3     psyllium (METAMUCIL) 0.52 gram capsule Take 0.52 g by mouth daily.       simvastatin (ZOCOR) 40 MG tablet Take 1 tablet (40 mg total) by mouth at bedtime. 90 tablet 3     zinc gluconate 30 mg Tab Take by mouth.       aspirin 81 MG EC tablet Take 1 tablet (81 mg total) by mouth daily.  0     No current facility-administered medications for this visit.

## 2021-06-17 NOTE — TELEPHONE ENCOUNTER
Telephone Encounter by Adriana Morris at 1/4/2021  8:35 AM     Author: Adriana Morris Service: -- Author Type: --    Filed: 1/4/2021  8:36 AM Encounter Date: 12/30/2020 Status: Signed    : Adriana Morris APPROVED:    Approval start date: 10/02/2020  Approval end date:  12/31/2021    Pharmacy has been notified of approval and will contact patient when medication is ready for pickup.

## 2021-06-17 NOTE — PATIENT INSTRUCTIONS - HE
1.  Continue metformin.  May need to increase dose if the hemoglobin A1c is above goal..    2.  Continue efforts at weight loss.     3.  See in 3 to 4 months    4.  Start aspirin 81 mg daily

## 2021-06-18 NOTE — LETTER
Letter by Benito Medina MD at      Author: Benito Medina MD Service: -- Author Type: --    Filed:  Encounter Date: 1/14/2019 Status: (Other)       Tawny Santos  [address] [place] [address]             January 14, 2019         Dear Ms. Santos,    Below are the results from your recent visit:    Resulted Orders   DXA Bone Density Scan    Narrative    1/11/2019      RE: Tawny Santos  YOB: 1934        Dear Benito Medina,    Patient Profile:  84 y.o. female, postmenopausal, is here for the follow up bone density   test.   History of fractures - None. Family history of osteoporosis - None.    Family history of hip fracture: None. Smoking history - Past. Osteoporosis   treatment past -  Yes;  HRT and Bisphosphonates. Osteoporosis treatment   current - No.  Chronic medical problems - Height loss. High risk   medications -  Blood thinner (Coumadin or Heparin);  Yes, in the Past.    Assessment:    1. The spine bone density is best assessed with L3 excluded.  Using the   other lumbar vertebrae, low bone mass is seen with a T-score of -1.9.  2. Femoral bone densities show normal bone density with a left femoral   neck T- score of -0.8, and right femoral neck T-score of -0.5.  3.  Since the scan in 2015, bone density has remained essentially stable   in the AP spine and both total hips    84 y.o. female with LOW BONE DENSITY (OSTEOPENIA) and MODERATE predicted   fracture risk. .    Recommendations:  Given the stability in bone density since last scan, a continuation of   current conservative preventative measures would be advised with a recheck   in 2-3 years.      Bone densitometry was performed on your patient using our vBrandXA   densitometer. The results are summarized and a copy of the actual scans   are included for your review. In conformity with the International Society   of Clinical Densitometry's most recent position statement for DXA   interpretation (2015), the diagnosis  will be made on the lowest measured   T-score of the lumbar spine, femoral neck, total proximal femur or 33%   radius. Note the change in terminology for diagnostic classification from   OSTEOPENIA to LOW BONE MASS. All trending for sequential exams will be   done using multiple vertebrae or the total proximal femur. Fracture risk   is based on the WHO Fracture Risk Assessment Tool (FRAX). If additional   information is needed or if you would like to discuss the results, please   do not hesitate to call me.       Thank you for referring this patient to [facility].   We are happy to be of service in support of you and your practice. If you   have any questions or suggestions to improve our service, please call me   at [phone].     Sincerely,     Benito Medina M.D. C.C.D.  Osteoporosis Services, [facility]         Tawny: Bone density has remained essentially stable since last scan.  Continue current management with a recheck in 2-3 years.    Please call with questions or contact us using [employer].    Sincerely,        Electronically signed by Benito Medina MD

## 2021-06-18 NOTE — PATIENT INSTRUCTIONS - HE
Patient Instructions by Benito Medina MD at 12/28/2020 11:15 AM     Author: Benito Medina MD Service: -- Author Type: Physician    Filed: 12/28/2020  7:20 PM Encounter Date: 12/28/2020 Status: Addendum    : Benito Medina MD (Physician)    Related Notes: Original Note by Benito Medina MD (Physician) filed at 12/28/2020 12:09 PM       1.  Covid vaccine when available.    2.  I will notify you of test results    3.  Continue current medications    4.  Recheck bone density in 1 to 2 years    5.  See in one year or as needed  Patient Education   Understanding USDA MyPlate  The USDA (US Department of Agriculture) has guidelines to help you make healthy food choices. These are called MyPlate. MyPlate shows the food groups that make up healthy meals using the image of a place setting. Before you eat, think about the healthiest choices for what to put onto your plate or into your cup or bowl. To learn more about building a healthy plate, visit www.choosemyplate.gov.       The Food Groups    Fruits: Any fruit or 100% fruit juice counts as part of the Fruit Group. Fruits may be fresh, canned, frozen, or dried, and may be whole, cut-up, or pureed. Make half your plate fruits and vegetables.    Vegetables: Any vegetable or 100% vegetable juice counts as a member of the Vegetable Group. Vegetables may be fresh, frozen, canned, or dried. They can be served raw or cooked and may be whole, cut-up, or mashed. Make half your plate fruits and vegetables.     Grains: All foods made from grains are part of the Grains Group. These include wheat, rice, oats, cornmeal, and barley such as bread, pasta, oatmeal, cereal, tortillas, and grits. Grains should be no more than a quarter of your plate. At least half of your grains should be whole grains.    Protein: This group includes meat, poultry, seafood, beans and peas, eggs, processed soy products (like tofu), nuts (including nut butters), and seeds. Make protein choices no  more than a quarter of your plate. Meat and poultry choices should be lean or low fat.    Dairy: All fluid milk products and foods made from milk that contain calcium, like yogurt and cheese are part of the Dairy Group. (Foods that have little calcium, such as cream, butter, and cream cheese, are not part of the group.) Most dairy choices should be low-fat or fat-free.    Oils: These are fats that are liquid at room temperature. They include canola, corn, olive, soybean, and sunflower oil. Foods that are mainly oil include mayonnaise, certain salad dressings, and soft margarines. You should have only 5 to 7 teaspoons of oils a day. You probably already get this much from the food you eat.  Use Scripted to Help Build Your Meals  The [employer] can help you plan and track your meals and activity. You can look up individual foods to see or compare their nutritional value. You can get guidelines for what and how much you should eat. You can compare your food choices. And you can assess personal physical activities and see ways you can improve. Go to www.HackPad.gov/supertracker/.    7499-6323 [employer]. [address] [place] [address]. All rights reserved. This information is not intended as a substitute for professional medical care. Always follow your healthcare professional's instructions.           Patient Education   Activities of Daily Living  Your Health Risk Assessment indicates you have difficulties with activities of daily living such as eating, getting dressed, grooming, bathing, walking, or using the toilet. Please make a follow up appointment for us to address this issue in more detail.     Patient Education   Instrumental Activities of Daily Living  Your Health Risk Assessment indicates you have difficulties with instrumental activities of daily living which include laundry, housekeeping, banking, shopping, using the telephone, food preparation, transportation, or  taking your own medications. Please make a follow up appointment for us to address this issue in more detail.    [employer] has resources available on the following website: https://www.[employer].org/caregivers.html     Also, here is a local agency that provides help with meals and other assistance:   [facility] Line: [phone]     Patient Education   Urinary Incontinence, Female (Adult)  Urinary incontinence means loss of control of the bladder. This problem affects many women, especially as they get older. If you have incontinence, you may be embarrassed to ask for help. But know that this problem can be treated.  Types of Incontinence  There are different types of incontinence. Two of the main types are described here. You can have more than one type.    Stress incontinence. With this type, urine leaks when pressure (stress) is put on the bladder. This may happen when you cough, sneeze, or laugh. Stress incontinence most often occurs because the pelvic floor muscles that support the bladder and urethra are weak. This can happen after pregnancy and vaginal childbirth or a hysterectomy. It can also be due to excess body weight or hormone changes.    Urge incontinence (also called overactive bladder). With this type, a sudden urge to urinate is felt often. This may happen even though there may not be much urine in the bladder. The need to urinate often during the night is common. Urge incontinence most often occurs because of bladder spasms. This may be due to bladder irritation or infection. Damage to bladder nerves or pelvic muscles, constipation, and certain medicines can also lead to urge incontinence.  Treatment of urinary incontinence depends on the cause. Further evaluation is needed to find the type you have. This will likely include an exam and certain tests. Based on the results, you and your healthcare provider can then plan treatment. Until a diagnosis is made, the home care tips below can help  relieve symptoms.  Home care    Do pelvic floor muscle exercises, if they are prescribed. The pelvic floor muscles help support the bladder and urethra. Many women find that their symptoms improve when doing special exercises that strengthen these muscles. To do the exercises contract the muscles you would use to stop your stream of urine, but do this when youre not urinating. Hold for 10 seconds, then relax. Repeat 10 to 20 times in a row, at least 3 times a day. Your provider may give you other instructions for how to do the exercises and how often.    Keep a bladder diary. This helps track how often and how much you urinate over a set period of time. Bring this diary with you to your next visit with the provider. The information can help your provider learn more about your bladder problem.    Lose weight, if advised to by your provider. Excess weight puts pressure on the bladder. Your provider can help you create a weight-loss plan thats right for you. This may include exercising more and making certain diet changes.    Don't consume foods and drinks that may irritate the bladder. These can include alcohol and caffeinated drinks.    Quit smoking. Smoking and other tobacco use can lead to chronic cough that strains the pelvic floor muscles. Smoking may also damage the bladder and urethra. Talk with your provider about treatments or methods you can use to quit smoking.    If drinking large amounts of fluid causes you to have symptoms, you may be advised to limit your fluid intake. You may also be advised to drink most of your fluids during the day and to limit fluids at night.    If youre worried about urine leakage or accidents, you may wear absorbent pads to catch urine. Change the pads often. This helps reduce discomfort. It may also reduce the risk of skin or bladder infections.  Follow-up care  Follow up with your healthcare provider, or as directed. It may take some to find the right treatment for your  problem. Your treatment plan may include special therapies or medicines. Certain procedures or surgery may also be options. Be sure to discuss any questions you have with your provider.  When to seek medical advice  Call the healthcare provider right away if any of these occur:    Fever of 100.4 F (38 C) or higher, or as directed by your provider    Bladder pain or fullness    Abdominal swelling    Nausea or vomiting    Back pain    Weakness, dizziness or fainting  Date Last Reviewed: 10/1/2017    2791-3639 [employer]. [address] [place] [address]. All rights reserved. This information is not intended as a substitute for professional medical care. Always follow your healthcare professional's instructions.       Advance Directive  Patients advance directive was discussed and I am comfortable with the patients wishes.  Patient Education   Personalized Prevention Plan  You are due for the preventive services outlined below.  Your care team is available to assist you in scheduling these services.  If you have already completed any of these items, please share that information with your care team to update in your medical record.  Health Maintenance   Topic Date Due   ? MEDICARE ANNUAL WELLNESS VISIT  12/28/2021   ? FALL RISK ASSESSMENT  12/28/2021   ? TD 18+ HE  11/01/2022   ? ADVANCE CARE PLANNING  12/28/2025   ? Pneumococcal Vaccine: Pediatrics (0 to 5 Years) and At-Risk Patients (6 to 64 Years)  Completed   ? Pneumococcal Vaccine: 65+ Years  Completed   ? INFLUENZA VACCINE RULE BASED  Completed   ? ZOSTER VACCINES  Completed   ? HEPATITIS B VACCINES  Aged Out

## 2021-06-19 NOTE — LETTER
Letter by Melvi Hodges AuD at      Author: Melvi Hodges AuD Service: -- Author Type: --    Filed:  Encounter Date: 9/17/2019 Status: (Other)       [facility]- Audiology Benefit Letter    CRISTIANE WARREN  5/12/1934  [address] [place] [address]    Insurance Company: Payor: MEDICARE / Plan: MEDICARE A AND B / Product Type: Medicare /      [place] Product: NO    ID # :  MEDICARE   [id number]             WOODY LOZADA   [id number]    Group#:  [employer]   [id number]    Estimate of Benefits  Consult Visit Benefits:  MEDICARE 5/99 ELIG, BC SR GOLD 1/17 ELIG    HAE, HAF, HAC Benefits:  MEDICARE AND BC SENIOR GOLD DO NOT COVER HEARING AIDS. PLEASE BILL AS A SELF PAY BUNDLE PACKAGE.    Batteries/Accessories Coverage:  NOT COVERED    Representative name: VIA RTE AND BC  Call reference:   Date of contact: 9/17/19    Insurance verified today by KEEGAN CALDERON    Additional Information:      The information provided is an estimate of benefits. This does not guarantee coverage; the insurance company will make the final determination of coverage to include patient responsibility of payment by the patient.   [facility] is not responsible for the decisions made by the insurance company regarding coverage.  Any changes to coverage (new plan or new policy) or procedures may void the contents provided in this letter.     Term Definitions  Patient responsibility: Can include but not limited to: co-pays, co-insurance, deductibles, out-of-pocket and non-covered and/or policy exclusions.

## 2021-06-19 NOTE — LETTER
Letter by Benito Medina MD at      Author: Benito Medina MD Service: -- Author Type: --    Filed:  Encounter Date: 11/13/2019 Status: Signed         Tawny Santos  [address] [place] [address]             November 13, 2019         Dear Ms. Santos,    Below are the results from your recent visit:    Resulted Orders   Urine,Microscopic   Result Value Ref Range    Bacteria, UA Moderate (!) None Seen hpf    RBC, UA None Seen None Seen, 0-2 hpf    WBC, UA  (!) None Seen, 0-5 hpf    Squam Epithel, UA 0-5 None Seen, 0-5 lpf       Tawny: The urine analysis is suspicious for infection.  I would advise taking the cephalexin 500 mg 3 times daily for 5 days.  We will check the urine culture when available to make sure the organism is sensitive to this antibiotic.    Please call with questions or contact us using [employer].    Sincerely,        Electronically signed by Benito Medina MD

## 2021-06-20 NOTE — LETTER
Letter by Benito Medina MD at      Author: Benito Medina MD Service: -- Author Type: --    Filed:  Encounter Date: 12/30/2019 Status: Signed         Tawny Santos  [address] [place] [address]             December 30, 2019         Dear Ms. Santos,    Below are the results from your recent visit:    Resulted Orders   Lipid Cascade   Result Value Ref Range    Cholesterol 182 <=199 mg/dL    Triglycerides 287 (H) <=149 mg/dL    HDL Cholesterol 42 (L) >=50 mg/dL    LDL Calculated 83 <=129 mg/dL    Patient Fasting > 8hrs? Yes    Comprehensive Metabolic Panel   Result Value Ref Range    Sodium 142 136 - 145 mmol/L    Potassium 3.9 3.5 - 5.0 mmol/L    Chloride 102 98 - 107 mmol/L    CO2 27 22 - 31 mmol/L    Anion Gap, Calculation 13 5 - 18 mmol/L    Glucose 104 70 - 125 mg/dL    BUN 12 8 - 28 mg/dL    Creatinine 0.77 0.60 - 1.10 mg/dL    GFR MDRD Af Amer >60 >60 mL/min/1.73m2    GFR MDRD Non Af Amer >60 >60 mL/min/1.73m2    Bilirubin, Total 0.8 0.0 - 1.0 mg/dL    Calcium 9.3 8.5 - 10.5 mg/dL    Protein, Total 7.2 6.0 - 8.0 g/dL    Albumin 4.2 3.5 - 5.0 g/dL    Alkaline Phosphatase 70 45 - 120 U/L    AST 16 0 - 40 U/L    ALT 16 0 - 45 U/L    Narrative    Fasting Glucose reference range is 70-99 mg/dL per  American Diabetes Association (ADA) guidelines.   HM2(CBC w/o Differential)   Result Value Ref Range    WBC 8.0 4.0 - 11.0 thou/uL    RBC 4.59 3.80 - 5.40 mill/uL    Hemoglobin 14.4 12.0 - 16.0 g/dL    Hematocrit 42.9 35.0 - 47.0 %    MCV 94 80 - 100 fL    MCH 31.4 27.0 - 34.0 pg    MCHC 33.5 32.0 - 36.0 g/dL    RDW 11.5 11.0 - 14.5 %    Platelets 234 140 - 440 thou/uL    MPV 7.4 7.0 - 10.0 fL       Tawny: Your cholesterol is good with a total value of 182 and an LDL fraction of 83.  Continue the simvastatin.  Triglycerides would improve with weight loss.  Other labs including your potassium, blood sugar, hemoglobin, liver and kidney tests are normal.  It was nice to see you.    Please call with  questions or contact us using [employer].    Sincerely,        Electronically signed by Benito Medina MD

## 2021-06-20 NOTE — LETTER
Letter by Benito Medina MD at      Author: Benito Medina MD Service: -- Author Type: --    Filed:  Encounter Date: 12/13/2019 Status: Signed         Tawny Santos  [address] [place] [address]             December 13, 2019         Dear Ms. Santos,    Below are the results from your recent visit:    Resulted Orders   Urinalysis-UC if Indicated   Result Value Ref Range    Color, UA Yellow Colorless, Yellow, Straw, Light Yellow    Clarity, UA Slightly Cloudy (!) Clear    Glucose, UA Negative Negative    Bilirubin, UA Negative Negative    Ketones, UA Negative Negative    Specific Gravity, UA <=1.005 1.005 - 1.030    Blood, UA Small (!) Negative    pH, UA 6.0 5.0 - 8.0    Protein, UA Negative Negative mg/dL    Urobilinogen, UA 0.2 E.U./dL 0.2 E.U./dL, 1.0 E.U./dL    Nitrite, UA Negative Negative    Leukocytes, UA Moderate (!) Negative    Bacteria, UA Few (!) None Seen hpf    RBC, UA None Seen None Seen, 0-2 hpf    WBC, UA 0-5 None Seen, 0-5 hpf    Squam Epithel, UA 0-5 None Seen, 0-5 lpf    Narrative    Urine Culture ordered based on [facility] criteria   Culture, Urine   Result Value Ref Range    Culture No Growth        Tawny: The urine analysis showed some white blood cells, but the culture returned negative for infection.    Please call with questions or contact us using "[employer].    Sincerely,        Electronically signed by Benito Medina MD

## 2021-06-21 NOTE — LETTER
Letter by Benito Medina MD at      Author: Benito Medina MD Service: -- Author Type: --    Filed:  Encounter Date: 5/3/2021 Status: (Other)         Tawny Santos  [address]  [place] [address]             May 3, 2021         Dear Ms. Santos,    Below are the results from your recent visit:    Resulted Orders   Basic Metabolic Panel   Result Value Ref Range    Sodium 143 136 - 145 mmol/L    Potassium 4.2 3.5 - 5.0 mmol/L    Chloride 103 98 - 107 mmol/L    CO2 28 22 - 31 mmol/L    Anion Gap, Calculation 12 5 - 18 mmol/L    Glucose 103 70 - 125 mg/dL    Calcium 9.4 8.5 - 10.5 mg/dL    BUN 19 8 - 28 mg/dL    Creatinine 0.75 0.60 - 1.10 mg/dL    GFR MDRD Af Amer >60 >60 mL/min/1.73m2    GFR MDRD Non Af Amer >60 >60 mL/min/1.73m2    Narrative    Fasting Glucose reference range is 70-99 mg/dL per  American Diabetes Association (ADA) guidelines.   Glycosylated Hemoglobin A1c   Result Value Ref Range    Hemoglobin A1c 6.1 (H) <=5.6 %   Lipid Cascade   Result Value Ref Range    Cholesterol 153 <=199 mg/dL    Triglycerides 210 (H) <=149 mg/dL    HDL Cholesterol 34 (L) >=50 mg/dL    LDL Calculated 77 <=129 mg/dL    Patient Fasting > 8hrs? Unknown        Tawny: Diabetic control is much improved with a blood sugar of 103 and a hemoglobin A1c of 6.1.  Compares to a hemoglobin A1c of 8.0 when last checked.  Results are improved with a total cholesterol of 153 and an LDL fraction of 77.  Triglycerides now are 210 compared to 460 when last checked.  Continue current medications.  Keep up the good work with diet and lifestyle.    Please call with questions or contact us using [employer].    Sincerely,        Electronically signed by Benito Medina MD

## 2021-06-21 NOTE — LETTER
Letter by Benito Medina MD at      Author: Benito Medina MD Service: -- Author Type: --    Filed:  Encounter Date: 12/30/2020 Status: (Other)         Tawny Santos  [address] [place] [address]             December 30, 2020         Dear Ms. Santos,    Below are the results from your recent visit:    Resulted Orders   Comprehensive Metabolic Panel   Result Value Ref Range    Sodium 142 136 - 145 mmol/L    Potassium 4.3 3.5 - 5.0 mmol/L    Chloride 101 98 - 107 mmol/L    CO2 28 22 - 31 mmol/L    Anion Gap, Calculation 13 5 - 18 mmol/L    Glucose 175 (H) 70 - 125 mg/dL    BUN 14 8 - 28 mg/dL    Creatinine 0.75 0.60 - 1.10 mg/dL    GFR MDRD Af Amer >60 >60 mL/min/1.73m2    GFR MDRD Non Af Amer >60 >60 mL/min/1.73m2    Bilirubin, Total 0.7 0.0 - 1.0 mg/dL    Calcium 9.2 8.5 - 10.5 mg/dL    Protein, Total 7.2 6.0 - 8.0 g/dL    Albumin 4.1 3.5 - 5.0 g/dL    Alkaline Phosphatase 72 45 - 120 U/L    AST 22 0 - 40 U/L    ALT 28 0 - 45 U/L    Narrative    Fasting Glucose reference range is 70-99 mg/dL per  American Diabetes Association (ADA) guidelines.   Lipid Cascade   Result Value Ref Range    Cholesterol 176 <=199 mg/dL    Triglycerides 460 (H) <=149 mg/dL    HDL Cholesterol 36 (L) >=50 mg/dL    LDL Calculated        Comment:      Invalid, Triglycerides >400    Patient Fasting > 8hrs? Yes    HM2(CBC w/o Differential)   Result Value Ref Range    WBC 7.8 4.0 - 11.0 thou/uL    RBC 4.64 3.80 - 5.40 mill/uL    Hemoglobin 14.6 12.0 - 16.0 g/dL    Hematocrit 42.0 35.0 - 47.0 %    MCV 91 80 - 100 fL    MCH 31.4 27.0 - 34.0 pg    MCHC 34.7 32.0 - 36.0 g/dL    RDW 11.4 11.0 - 14.5 %    Platelets 202 140 - 440 thou/uL    MPV 7.6 7.0 - 10.0 fL   Urinalysis-UC if Indicated   Result Value Ref Range    Color, UA Yellow Colorless, Yellow, Straw, Light Yellow    Clarity, UA Clear Clear    Glucose, UA Negative Negative    Bilirubin, UA Negative Negative    Ketones, UA Negative Negative    Specific Gravity, UA >=1.030 1.005  "- 1.030    Blood, UA Trace (!) Negative    pH, UA 5.5 5.0 - 8.0    Protein, UA Negative Negative mg/dL    Urobilinogen, UA 0.2 E.U./dL 0.2 E.U./dL, 1.0 E.U./dL    Nitrite, UA Negative Negative    Leukocytes, UA Trace (!) Negative    Bacteria, UA Moderate (!) None Seen hpf    RBC, UA 3-5 (!) None Seen, 0-2 hpf    WBC, UA 5-10 (!) None Seen, 0-5 hpf    Squam Epithel, UA 0-5 None Seen, 0-5 lpf    Narrative    Urine Culture ordered based on [facility]' criteria   Vitamin D, Total (25-Hydroxy)   Result Value Ref Range    Vitamin D, Total (25-Hydroxy) 40.2 30.0 - 80.0 ng/mL    Narrative    Deficiency <10.0 ng/mL  Insufficiency 10.0-29.9 ng/mL  Sufficiency 30.0-80.0 ng/mL  Toxicity (possible) >100.0 ng/mL   Culture, Urine   Result Value Ref Range    Culture >100,000 col/ml Escherichia coli (!)    Custom LDL Cholesterol, Direct   Result Value Ref Range    Direct LDL 81 <=129 mg/dl       Tawny: Your urine culture returned positive for E. coli.  If you do not have symptoms of infection, this most likely represents \"asymptomatic bacteriuria \".  Infectious disease experts do not recommend antibiotic treatment without symptoms.  Your blood sugar was elevated to 175.  This raises concerns about possible type 2 diabetes mellitus.  I would advise checking a hemoglobin A1c as a \"lab only \"appointment for further evaluation.  I will leave a lab order for this.  Your cholesterol is good with a total value of 176 and an LDL fraction of 81.  Continue the simvastatin.  Triglycerides are substantially elevated at 460.  Weight loss would be of benefit for this.  Your vitamin D level is in a good range at 40.2.  Continue the current supplement.  Other labs including your potassium, hemoglobin, liver and kidney tests are normal.  It was nice to see you.    Please call with questions or contact us using [employer].    Sincerely,        Electronically signed by Benito Medina MD       "

## 2021-06-21 NOTE — PROGRESS NOTES
ASSESSMENT:  1. Mixed hyperlipidemia  She remains on simvastatin 40 mg daily without adverse effects.  Fasting lipid cascade will be checked  - simvastatin (ZOCOR) 40 MG tablet; TAKE 1 TABLET BY MOUTH AT BEDTIME..  Dispense: 90 tablet; Refill: 3  - Lipid Cascade    2. Esophageal reflux  Symptoms are under fairly good control with omeprazole.  She uses this as needed rather than nightly  - omeprazole (PRILOSEC) 20 MG capsule; TAKE 1 CAPSULE BY MOUTH DAILY AS NEEDED..  Dispense: 100 capsule; Refill: 3    3.  Essential hypertension  Control is good with the combination of metoprolol and losartan-HCTZ  - metoprolol succinate (TOPROL-XL) 50 MG 24 hr tablet; Take 1 tablet (50 mg total) by mouth daily.  Dispense: 90 tablet; Refill: 0  - Urinalysis-UC if Indicated  - Comprehensive Metabolic Panel  - losartan-hydrochlorothiazide (HYZAAR) 50-12.5 mg per tablet; TAKE ONE TABLET BY MOUTH DAILY.  Dispense: 90 tablet; Refill: 3    5. Morbid obesity (H)  She was encouraged to work on weight loss with exercise    6. Acute cystitis with hematuria  She would like a prescription of cephalexin to use if needed for travel  - cephalexin (KEFLEX) 500 MG capsule; Take 1 capsule (500 mg total) by mouth 3 (three) times a day for 5 days.  Dispense: 15 capsule; Refill: 0  - Urinalysis-UC if Indicated    7. Senile osteoporosis  Bone density study was advised  - DXA Bone Density Scan; Future    8. Medication monitoring encounter  Hemogram will be checked  - HM2(CBC w/o Differential)    9. Visit for screening mammogram  Pros and cons of continuing screening mammograms at age 84 was discussed.  Since she is overall in good health mammograms every 1-2 years was recommended  - Mammo Screening Bilateral; Future    10. Medicare annual wellness visit, subsequent  No cognitive deficits were noted.  Her health risk assessment was reviewed.  She is independent in activities of daily living.  She does have a healthcare directive.    11.  Health  maintenance:  Shingrix was advised.  Health maintenance and screening issues were discussed  PLAN:  Patient Instructions   1.  I will notify you of test results    2.  Advise mammogram    3.  DXA scan in future    4.  Work on weight loss.    5.  Shingrix advised.  Check insurance    6.  See in one year or as needed      Orders Placed This Encounter   Procedures     DXA Bone Density Scan     Standing Status:   Future     Standing Expiration Date:   11/19/2019     Order Specific Question:   Can the procedure be changed per Radiologist protocol?     Answer:   Yes     Mammo Screening Bilateral     Standing Status:   Future     Standing Expiration Date:   2/19/2020     Order Specific Question:   Patient's previous breast density:     Answer:   Scattered fibroglandular density [2]     Order Specific Question:   Can the procedure be changed per Radiologist protocol?     Answer:   Yes     Lipid Cascade     Order Specific Question:   Fasting is required?     Answer:   Unknown     Urinalysis-UC if Indicated     Comprehensive Metabolic Panel     HM2(CBC w/o Differential)     Medications Discontinued During This Encounter   Medication Reason     simvastatin (ZOCOR) 40 MG tablet Reorder     omeprazole (PRILOSEC) 20 MG capsule Reorder     metoprolol succinate (TOPROL-XL) 50 MG 24 hr tablet Reorder     losartan-hydrochlorothiazide (HYZAAR) 50-12.5 mg per tablet Reorder         ASSESSED PROBLEMS:  Problem List Items Addressed This Visit     Esophageal Reflux    Relevant Medications    omeprazole (PRILOSEC) 20 MG capsule    Essential hypertension    Relevant Medications    metoprolol succinate (TOPROL-XL) 50 MG 24 hr tablet    Other Relevant Orders    Urinalysis-UC if Indicated (Completed)    Comprehensive Metabolic Panel (Completed)    Obesity (BMI 35.0-39.9) with comorbidity (H)    RESOLVED: Hyperlipidemia    Relevant Medications    simvastatin (ZOCOR) 40 MG tablet    Other Relevant Orders    Lipid Cascade (Completed)      Other  Visit Diagnoses     Acute cystitis with hematuria    -  Primary    Relevant Orders    Urinalysis-UC if Indicated (Completed)    Hypertension        Relevant Medications    metoprolol succinate (TOPROL-XL) 50 MG 24 hr tablet    losartan-hydrochlorothiazide (HYZAAR) 50-12.5 mg per tablet    Other Relevant Orders    Urinalysis-UC if Indicated (Completed)    Comprehensive Metabolic Panel (Completed)    Senile osteoporosis        Relevant Orders    DXA Bone Density Scan    Medication monitoring encounter        Relevant Orders    HM2(CBC w/o Differential) (Completed)    Visit for screening mammogram        Relevant Orders    Mammo Screening Bilateral    Medicare annual wellness visit, subsequent              CHIEF COMPLAINT:  Chief Complaint   Patient presents with     Annual Wellness Visit     pt fasting     Routine Health Maintenance     due for Dxa, Ca pend the order     Medication Refill     re new meds       HISTORY OF PRESENT ILLNESS:  Tawny Santos is a 84 y.o. female, here today for an annual wellness exam.  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  She lives independently.  No cognitive deficits were noted.  She is independent in activities of daily living and does have a living.  She acknowledges that weight is above goal.  She has had difficulties maintaining weight loss.  She has a past history of hypertension.  Blood pressure has been good on current regimen.    Has a history of frequent UTIs.  She requests a prescription of an antibiotic for travel..    Has had an influenza vaccine.  She was encouraged to have shingrix vaccine in the future    She has history of low bone mass for the last bone density study in 2015.  Follow-up study was advised.    REVIEW OF SYSTEMS:   All other systems are negative.  She reports that she has poor balance with some left-sided weakness which she attributes to meningitis years ago.  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  She has  considerable light sensitivity, but otherwise has done well since this    PFSH:  Past Medical History:   Diagnosis Date     Meningitis      Past Surgical History:   Procedure Laterality Date     BREAST BIOPSY Left      HYSTERECTOMY       IN APPENDECTOMY      Description: Appendectomy;  Recorded: 10/24/2008;     IN BIOPSY OF BREAST, INCISIONAL      Description: Biopsy Breast Open;  Recorded: 10/24/2008;     IN REVISE MEDIAN N/CARPAL TUNNEL SURG      Description: Neuroplasty Decompression Median Nerve At Carpal Tunnel;  Proc Date: 11/01/2013;     IN TOTAL ABDOM HYSTERECTOMY      Description: Hysterectomy;  Recorded: 10/24/2008;     IN TOTAL KNEE ARTHROPLASTY      Description: Total Knee Arthroplasty;  Recorded: 10/24/2008;     TENDON RELEASE      Thumbs (trigger finger)     Family History   Problem Relation Age of Onset     Colon cancer Mother      Stroke Father      Heart disease Father      Social History     Socioeconomic History     Marital status:      Spouse name: Not on file     Number of children: Not on file     Years of education: Not on file     Highest education level: Not on file   Social Needs     Financial resource strain: Not on file     Food insecurity - worry: Not on file     Food insecurity - inability: Not on file     Transportation needs - medical: Not on file     Transportation needs - non-medical: Not on file   Occupational History     Occupation: Retired   Tobacco Use     Smoking status: Former Smoker     Smokeless tobacco: Never Used     Tobacco comment: Quit smoking around 1960   Substance and Sexual Activity     Alcohol use: No     Comment: pt stopped smoking around 1960      Drug use: No     Sexual activity: Not on file   Other Topics Concern     Not on file   Social History Narrative    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 She travelled to [place] in 2003. She describes her daily diet as healthy. She participates in playing bridge at  "[employer] and family activities including going to the lake. She bikes about 15min 6x a week. She always wears a seatbelt while driving. There are not guns in her home.          PHYSICAL EXAM:  Vitals:    11/19/18 1110   BP: 130/70   Patient Site: Left Arm   Patient Position: Sitting   Cuff Size: Adult Large   Pulse: 68   Temp: 96.8  F (36  C)   TempSrc: Tympanic   SpO2: 98%   Weight: 196 lb (88.9 kg)   Height: 5' 2\" (1.575 m)     Body mass index is 35.85 kg/m .  Physical exam   /70 (Patient Site: Left Arm, Patient Position: Sitting, Cuff Size: Adult Large)   Pulse 68   Temp 96.8  F (36  C) (Tympanic)   Ht 5' 2\" (1.575 m)   Wt 196 lb (88.9 kg)   SpO2 98%   BMI 35.85 kg/m      General Appearance:  Alert, cooperative, no distress, appears stated age   Head:  Normocephalic, without obvious abnormality, atraumatic   Eyes:   Aphakia right eye EOMs full pupil on the left reactive   Ears:  Normal TM's and external ear canals, both ears   Nose: Nares normal, septum midline,mucosa normal, no drainage    Throat: Lips, mucosa, and tongue normal; teeth and gums normal   Neck: Supple, symmetrical, trachea midline, no adenopathy;  thyroid: not enlarged, symmetric, no tenderness/mass/nodules; no carotid bruit or JVD   Back:   Symmetric, no curvature, ROM normal, no CVA tenderness   Lungs:   Clear to auscultation bilaterally, respirations unlabored   Breasts:  No breast masses, tenderness, asymmetry, or nipple discharge.   Heart:  Regular rate and rhythm, S1 and S2 normal, no murmur, rub, or gallop   Abdomen:    Moderately obese.  Active bowel sounds.  Old surgical scars.  No focal tenderness   Genitalia:  Deferred   Pelvic: Deferred   Extremities:  Trace ankle edema.  Pulses in the feet intact   Skin: Skin color, texture, turgor normal, no rashes or lesions   Lymph nodes: Cervical, supraclavicular, and axillary nodes normal   Neurologic:  No dysarthria or aphasia.  Cranial nerves motor sensory exams seem intact. "             Fall risk assessment: Fairly normal for age activities of Daily Living assessment: Dependent in activities of daily living  Advance Directives: She has a healthcare directive      Health maintenance screening:       Counseling:  Risk factors for depression: None currently noted  Functional ability and level of safety: Independent in activities of daily living   Hearing impairment: No significant hearing loss   Ability to successfully perform activities of daily living: Independent   Fall risk: Moderate   Home safety: No increased risk    Current providers: Benito Medina MD          ADDITIONAL HISTORY SUMMARIZED (FROM OLD RECORDS OR HISTORY FROM SOMEONE OTHER THAN THE PATIENT OR ANOTHER HEALTHCARE PROVIDER) (2 TOTAL): None.     DECISION TO OBTAIN EXTRA INFORMATION (OLD RECORDS REQUESTED OR HISTORY FROM ANOTHER PERSON OR ACCESSING CARE EVERYWHERE) (1 TOTAL): None.     RADIOLOGY TESTS SUMMARIZED OR ORDERED (XRAY/CT/MRI/DXA) (1 TOTAL): None.    LABS REVIEWED OR ORDERED (1 TOTAL): None.    MEDICINE TESTS SUMMARIZED OR ORDERED (EKG/ECHO/COLONOSCOPY/EGD) (1 TOTAL): None.    INDEPENDENT REVIEW OF EKG OR X-RAY (2 EACH): None.     The visit lasted a total of 35 minutes face to face with the patient. Over 50% of the time was spent counseling and educating the patient about health maintenance issues.        MEDICATIONS:  Current Outpatient Medications   Medication Sig Dispense Refill     antiox#10-om3-dha-epa-lut-zeax (I-CAPS) 280-10-2 mg cap Take by mouth.       calcium-vitamin D (CALCIUM-VITAMIN D) 500 mg(1,250mg) -200 unit per tablet Take 1 tablet by mouth daily.       co-enzyme Q-10 30 mg capsule Take 30 mg by mouth daily.        cranberry 400 mg cap Take by mouth.       ibuprofen (ADVIL,MOTRIN) 200 MG tablet Take 600 mg by mouth every 6 (six) hours as needed for pain.       KRILL/OM-3/DHA/EPA/PHOSPHO/AST (MEGARED OMEGA-3 KRILL OIL ORAL) Take 300 mg by mouth daily.       losartan-hydrochlorothiazide (HYZAAR)  50-12.5 mg per tablet TAKE ONE TABLET BY MOUTH DAILY. 90 tablet 3     metoprolol succinate (TOPROL-XL) 50 MG 24 hr tablet Take 1 tablet (50 mg total) by mouth daily. 90 tablet 0     multivitamin with minerals (THERA-M) 9 mg iron-400 mcg Tab tablet Take 1 tablet by mouth daily.       omeprazole (PRILOSEC) 20 MG capsule TAKE 1 CAPSULE BY MOUTH DAILY AS NEEDED.. 100 capsule 3     psyllium (METAMUCIL) 0.52 gram capsule Take 0.52 g by mouth daily.       simvastatin (ZOCOR) 40 MG tablet TAKE 1 TABLET BY MOUTH AT BEDTIME.. 90 tablet 3     No current facility-administered medications for this visit.        Total data points:

## 2021-06-23 NOTE — TELEPHONE ENCOUNTER
RN cannot approve Refill Request    RN can NOT refill this medication refill too soon Last refilled on 11/19/18 x 1 yr . Last office visit: Visit date not found Last Physical: 11/19/2018 Last MTM visit: Visit date not found Last visit same specialty: Visit date not found.  Next visit within 3 mo: Visit date not found  Next physical within 3 mo: Visit date not found      Ankur Turner, [facility] Triage/Med Refill 1/19/2019    Requested Prescriptions   Pending Prescriptions Disp Refills     omeprazole (PRILOSEC) 20 MG capsule [Pharmacy Med Name: OMEPRAZOLE 20MG CAPSULES] 100 capsule 3     Sig: TAKE 1 CAPSULE BY MOUTH DAILY AS NEEDED    GI Medications Refill Protocol Passed - 1/18/2019 12:01 PM       Passed - PCP or prescribing provider visit in last 12 or next 3 months.    Last office visit with prescriber/PCP: Visit date not found OR same dept: Visit date not found OR same specialty: Visit date not found  Last physical: 11/19/2018 Last MTM visit: Visit date not found   Next visit within 3 mo: Visit date not found  Next physical within 3 mo: Visit date not found  Prescriber OR PCP: Benito Medina MD  Last diagnosis associated with med order: 1. Esophageal reflux  - omeprazole (PRILOSEC) 20 MG capsule [Pharmacy Med Name: OMEPRAZOLE 20MG CAPSULES]; TAKE 1 CAPSULE BY MOUTH DAILY AS NEEDED  Dispense: 100 capsule; Refill: 3    If protocol passes may refill for 12 months if within 3 months of last provider visit (or a total of 15 months).

## 2021-06-30 NOTE — PROGRESS NOTES
Progress Notes by No Zamora RD at 2/22/2021  3:00 PM     Author: No Zamora RD Service: -- Author Type: Registered Dietitian    Filed: 2/22/2021  3:45 PM Encounter Date: 2/22/2021 Status: Attested    : No Zamora RD (Registered Dietitian) Cosigner: Benito Medina MD at 2/22/2021  4:34 PM    Attestation signed by Benito Medina MD at 2/22/2021  4:34 PM                      Type of Service: Telephone Visit     Assessment: Follow up diabetes education visit for Tawny who has new diagnosis of type 2 diabetes. She is currently taking 500 mg XR metformin with breakfast; she is tolerating it well.   There was a problem at the pharmacy getting her gluocse meter so she hasn't started checking her BG yet.       She continues to follow healthy nutrition guidelines and reports a 7 lbs weight loss since  diagnosis.  She is eating smaller carb portions.  She and her spouse live independently, reports daughters are very helpful. She does use a st bike for  10 minutes daily. Likes to walk outside if weather permitting.  She did received eduational materials that were mailed.    Plan: Contacted pharmacy and her meter and supplies are ready for .  Tawny agreed to get meter- will follow up in 1-2 weeks to see if additional meter training is needed.   To call with any questions/concerns.     1) check BG as noted above   2) increase activity as able  3) Follow carbohydrate recommendations     Subjective and Objective:   Tawny Santos is referred by Dr. Medina for Diabetes Education.         Lab Results   Component Value Date    HGBA1C 8.0 (H) 01/18/2021     Meter (per above goals): assessment, discussed,   Monitoring: assessment, discussed, -  reviewed value in doing home BG testing  BG goals: assessment, discussed,    Nutrition Management: assessment, discussed,   Weight:assessment, discussed,   Portions/Balance: assessment, discussed,  Carb ID/Count: assessment,  "discussed,   Physical Activity: assessment, discussed, literature provided - encouraged as able  Medications: assessment, discussed   Orals: assessment, discussed     Diabetes Disease Process: Assessed and Discussed   Acute Complications: Prevent, Detect, Treat:   Hypoglycemia: Discussed   Hyperglycemia: Assessed and Discussed   Sick Days: Literature provided and Needs instruction/review at follow-up   Chronic Complications   Foot Care: literature provided   Skin Care: Literature provided   Eye: Literature provided   ABC: Literature provided   Teeth:Discussed   Goal Setting and Problem Solving: Discussed   Barriers: Discussed   Psychosocial Adjustments: Discussed   Time spent with the patient: 25 minutes for diabetes education and counseling- phone.   Previous Education: yes      There were no vitals taken for this visit.   Estimated body mass index is 36.88 kg/m  as calculated from the following:   Height as of 12/28/20: 5' 1.75\" (1.568 m).   Weight as of 12/28/20: 200 lb (90.7 kg).       BP Readings from Last 3 Encounters:   12/28/20 120/74   12/23/19 132/74   11/19/18 130/70     Social History          Tobacco Use   Smoking Status Former Smoker   Smokeless Tobacco Never Used   Tobacco Comment    Quit smoking around 1960     Labs:   No components found for: A1C   No components found for: GLC   No results found for: LDL         HDL Cholesterol   Date Value Ref Range Status   12/28/2020 36 (L) >=50 mg/dL Final   ]      Healthy Eating:   Patient has the following eating practices:   smaller portions has been following   Beverages: Water 3/day   Cultural/Orthodoxy diet restrictions: No   Being Active:   Types: Bike - st   Monitoring: will  meter and start, referred to training videos   Taking Medication:   Taking diabetes medications? Yes          Current Outpatient Medications on File Prior to Visit   Medication Sig Dispense Refill   ? antiox#10-om3-dha-epa-lut-zeax (I-CAPS) 280-10-2 mg cap Take by mouth.   "   ? calcium-vitamin D (CALCIUM-VITAMIN D) 500 mg(1,250mg) -200 unit per tablet Take 1 tablet by mouth daily.     ? co-enzyme Q-10 30 mg capsule Take 30 mg by mouth daily.      ? cranberry 400 mg cap Take by mouth.     ? diclofenac sodium (VOLTAREN) 1 % Gel Four gram four times daily as needed for joint pain lower extremities, two gm four times daily for small joints upper extremities. 100 g 6   ? ibuprofen (ADVIL,MOTRIN) 200 MG tablet Take 600 mg by mouth every 6 (six) hours as needed for pain.     ? KRILL/OM-3/DHA/EPA/PHOSPHO/AST (MEGARED OMEGA-3 KRILL OIL ORAL) Take 300 mg by mouth daily.     ? losartan-hydrochlorothiazide (HYZAAR) 50-12.5 mg per tablet TAKE 1 TABLET BY MOUTH DAILY 90 tablet 3   ? metFORMIN (GLUCOPHAGE-XR) 500 MG 24 hr tablet TAKE 1 TABLET(500 MG) BY MOUTH DAILY WITH BREAKFAST 90 tablet 3   ? metoprolol succinate (TOPROL-XL) 50 MG 24 hr tablet Take 1 tablet (50 mg total) by mouth daily. 90 tablet 3   ? multivitamin with minerals (THERA-M) 9 mg iron-400 mcg Tab tablet Take 1 tablet by mouth daily.     ? omeprazole (PRILOSEC) 20 MG capsule Take 1 capsule by mouth every other day. 45 capsule 3   ? psyllium (METAMUCIL) 0.52 gram capsule Take 0.52 g by mouth daily.     ? simvastatin (ZOCOR) 40 MG tablet Take 1 tablet (40 mg total) by mouth at bedtime. 90 tablet 3     No current facility-administered medications on file prior to visit.      Medication side effects? No   Missing medication doses? No   Problem Solving:   At risk for hypoglycemia? No   no problems noted.   Symptoms of hyperglycemia? none   Reducing Risks:   Recent health service and resource utilization related to diabetes (hyperglycemia, hypoglycemia, etc):   None   Diabetes Risk Factors:   family history and age over 45 years   Healthy Coping:   Patient's emotional response to diabetes: expresses readiness to learn   Do you have any difficulty affording your medications or glucose monitoring supplies? No   Socio/Economic/Cultural  Considerations:   Support system: spouse/significant other

## 2021-06-30 NOTE — PROGRESS NOTES
"Progress Notes by No Zamora RD at 3/8/2021 10:30 AM     Author: No Zamora RD Service: -- Author Type: Registered Dietitian    Filed: 3/8/2021 11:26 AM Encounter Date: 3/8/2021 Status: Attested    : No Zamora RD (Registered Dietitian) Cosigner: Benito Medina MD at 3/8/2021  1:52 PM    Attestation signed by Benito Medina MD at 3/8/2021  1:52 PM                      Type of Service: Telephone Visit      Assessment: Follow up diabetes education visit for Tawny who has new diagnosis of type 2 diabetes. She is currently taking 500 mg XR metformin with breakfast; she is tolerating it well.  She has started to use [employer] meal plan for type 2 diabetes.   She finds this very helpful and easy.   She continues to cook for her  but he likes the \"gravies and carbs\" .   She reports a 13 lb weight loss since  diagnosis.  She eats three times daily;  her last meal is at 5 pm, up at 8:30 am. She and her spouse live independently, reports daughters are very helpful. She has 16 grandchildren.  She uses  a st bike for  10 minutes daily.   Looking forward to going outside to walk when weather permits.  She reports having some problems getting big enough drop blood for BG testing.      FBS  154, 142, 161, 168, 149  2 hour post BF:  170, 129, 190, 117, 187, 159, 194, 137     Plan: Reviewed BG goals.  FBS are above target, but reasonable given her age.   Suggested trying healthycarb/protein at bedtime which may help keep FBS in target of 130-150 mg/dl. Congratulated her on healthy changes.   Discussed options in place of [facility] but  support her if she finds this helpful.  Reveiwed BG technique and suggestions for obtaing larger blood drop.  Recommend testing FBS 3 times per week.  She has appt Dr. Medina in April, Alc due at that time.  Fup CDE in one month.    To call with any questions/concerns.      1) check BG as noted above - complete  2) increase activity as able- " "complete  3) Follow carbohydrate recommendations - complete     Subjective and Objective:   Tawny Santos is referred by Dr. Medina for Diabetes Education.             Lab Results   Component Value Date     HGBA1C 8.0 (H) 01/18/2021      Meter (per above goals): assessment, discussed,   Monitoring: assessment, discussed, -  set reasonable target of 3 days per FBS  BG goals: assessment, discussed,     Nutrition Management: assessment, discussed,   Weight:assessment, discussed,   Portions/Balance: assessment, discussed,  Carb ID/Count: assessment, discussed,   Physical Activity: assessment, discussed, literature provided - encouraged as able  Medications: assessment, discussed   Orals: assessment, discussed      Diabetes Disease Process: Assessed and Discussed   Acute Complications: Prevent, Detect, Treat:   Hypoglycemia: Discussed   Hyperglycemia: Assessed and Discussed   Sick Days: Literature provided and Needs instruction/review at follow-up   Chronic Complications   Foot Care: literature provided   Skin Care: Literature provided   Eye: Literature provided   ABC: Literature provided   Teeth:Discussed   Goal Setting and Problem Solving: Discussed   Barriers: Discussed   Psychosocial Adjustments: Discussed   Time spent with the patient: 30 minutes for diabetes education and counseling- phone.   Previous Education: yes       There were no vitals taken for this visit.   Estimated body mass index is 36.88 kg/m  as calculated from the following:   Height as of 12/28/20: 5' 1.75\" (1.568 m).   Weight as of 12/28/20: 200 lb (90.7 kg).         BP Readings from Last 3 Encounters:   12/28/20 120/74   12/23/19 132/74   11/19/18 130/70      Social History              Tobacco Use   Smoking Status Former Smoker   Smokeless Tobacco Never Used   Tobacco Comment     Quit smoking around 1960            "

## 2021-06-30 NOTE — PROGRESS NOTES
Progress Notes by No Zamora RD at 5/19/2021 11:00 AM     Author: No Zamora RD Service: -- Author Type: Registered Dietitian    Filed: 5/19/2021 11:33 AM Encounter Date: 5/19/2021 Status: Attested    : No Zamora RD (Registered Dietitian) Cosigner: Benito Medina MD at 5/20/2021  6:00 PM    Attestation signed by Benito Medina MD at 5/20/2021  6:00 PM                      Type of Service: Telephone Visit - 25 minutes     Assessment: Follow up diabetes education visit for Tawny who has new diagnosis of type 2 diabetes. She is currently taking 500 mg XR metformin with breakfast; she is tolerating it well.  Currently 180 lbs. which is 20lb loss since 12/2020.    She has healthy diet;  she eats three times daily;  her last meal is at 5 pm, up at 8:30 am.  She uses  a st bike for  10 minutes daily.   Looking forward to going outside to walk when weather permits.   Alc 4/30/21  6.1%    She is checking 3 days per week:      FBS  147,  161, 153  2 hour post :  142,  119, 103,      Plan: Congratulated her on the healthy changes and recent Alc.   Reviewed BG goals and acheiving reasonable target given her age.    Reinforced importance of  healthy diet with good variety including fruit, breads and dairy and not have extreme limitations.   She is attending weddings of grandchildren this summer, sugggested she enjoy the wedding cake.   No follow up with CDE at this point,  to call with any questions/concerns.   Rec checking BG 2 days per week, 2 times per day to keep on top of BG levels.           1) check BG as noted above - complete  2) increase activity as able- complete  3) Follow carbohydrate recommendations - complete     Subjective and Objective:   Tawny Santos is referred by Dr. Medina for Diabetes Education.             Lab Results   Component Value Date     HGBA1C 8.0 (H) 01/18/2021      Meter (per above goals): assessment, discussed,   Monitoring: assessment,  discussed, -  set reasonable target of 2 days per week  BG goals: assessment, discussed,     Nutrition Management: assessment, discussed, - reasonable target, advised not to skip any food groups, encouraged plenty of water  Weight:assessment, discussed,   Portions/Balance: assessment, discussed,  Carb ID/Count: assessment, discussed,   Physical Activity: assessment, discussed, literature provided - encouraged as able  Medications: assessment, discussed   Orals: assessment, discussed      Diabetes Disease Process: Assessed and Discussed   Acute Complications: Prevent, Detect, Treat:   Hypoglycemia: Discussed   Hyperglycemia: Assessed and Discussed   Sick Days: Literature provided and Needs instruction/review at follow-up   Chronic Complications   Foot Care: literature provided   Skin Care: Literature provided   Eye: Literature provided   ABC: Literature provided   Teeth:Discussed   Goal Setting and Problem Solving: Discussed   Barriers: Discussed   Psychosocial Adjustments: Discussed   Time spent with the patient: 25 minutes for diabetes education and counseling- phone.   Previous Education: yes

## 2021-06-30 NOTE — PROGRESS NOTES
Progress Notes by No Zamora RD at 4/14/2021 10:30 AM     Author: No Zamora RD Service: -- Author Type: Registered Dietitian    Filed: 4/14/2021 11:14 AM Encounter Date: 4/14/2021 Status: Attested    : No Zamora RD (Registered Dietitian) Cosigner: Benito Medina MD at 4/14/2021 11:56 AM    Attestation signed by Benito Medina MD at 4/14/2021 11:56 AM                      Type of Service: Telephone Visit - 25 minutes     Assessment: Follow up diabetes education visit for Tawny who has new diagnosis of type 2 diabetes. She is currently taking 500 mg XR metformin with breakfast; she is tolerating it well.  She communicates her frustration as she hasn't been able to lose any more weight;  currently 180 lbs. Which is 20lb loss since 12/2020  She has been using [employer] meal plan for type 2 diabetes.   She is asking today about the Keto diet.  She continues to cook separate meals for her  . She eats three times daily;  her last meal is at 5 pm, up at 8:30 am.  She uses  a st bike for  10 minutes daily.   Looking forward to going outside to walk when weather permits.  She reports having some problems getting big enough drop blood for BG testing.     She is checking 3 days per week:     FBS  151,  170,  159, 159,    2 hour post BF:  114,  175(BF),  212 (muffin), 129       Plan: Reviewed BG goals and acheiving reasonable target given her age.   She could possibly increase metformin to 500 mg bid with careful monitoring of eGFR.  To discuss this with Dr. Medina at the end of the month appointment.   Discussed some concerns about following Keto diet, she needs to have healthy diet with good variety including fruit, breads and dairy and not have extreme limitations.   She has appt with  Dr. Medina in April, Alc due at that time.  Fup CDE in one month.  She agreed not to exclude food groups from her diet, but will try to increase protein and vegetables.  She will  "also try to increase her activity,  use st bike twice a day when too cold to go outside.   Congratulated and encouraged her on efforts.        1) check BG as noted above - complete  2) increase activity as able- complete  3) Follow carbohydrate recommendations - complete     Subjective and Objective:   Tawny Santos is referred by Dr. Medina for Diabetes Education.             Lab Results   Component Value Date     HGBA1C 8.0 (H) 01/18/2021      Meter (per above goals): assessment, discussed,   Monitoring: assessment, discussed, -  set reasonable target of 3 days per FBS  BG goals: assessment, discussed,     Nutrition Management: assessment, discussed, - reasonable target, advised not to skip any food groups, encouraged plenty of water  Weight:assessment, discussed,   Portions/Balance: assessment, discussed,  Carb ID/Count: assessment, discussed,   Physical Activity: assessment, discussed, literature provided - encouraged as able  Medications: assessment, discussed   Orals: assessment, discussed      Diabetes Disease Process: Assessed and Discussed   Acute Complications: Prevent, Detect, Treat:   Hypoglycemia: Discussed   Hyperglycemia: Assessed and Discussed   Sick Days: Literature provided and Needs instruction/review at follow-up   Chronic Complications   Foot Care: literature provided   Skin Care: Literature provided   Eye: Literature provided   ABC: Literature provided   Teeth:Discussed   Goal Setting and Problem Solving: Discussed   Barriers: Discussed   Psychosocial Adjustments: Discussed   Time spent with the patient: 25 minutes for diabetes education and counseling- phone.   Previous Education: yes       There were no vitals taken for this visit.   Estimated body mass index is 36.88 kg/m  as calculated from the following:   Height as of 12/28/20: 5' 1.75\" (1.568 m).   Weight as of 12/28/20: 200 lb (90.7 kg).         BP Readings from Last 3 Encounters:   12/28/20 120/74   12/23/19 132/74   11/19/18 " 130/70

## 2021-07-03 NOTE — ADDENDUM NOTE
Addendum Note by Benito Trinidad MD at 5/25/2017  8:03 AM     Author: Benito Trinidad MD Service: -- Author Type: Physician    Filed: 5/25/2017  8:03 AM Encounter Date: 5/24/2017 Status: Signed    : Benito Trinidad MD (Physician)    Addended by: BENITO TRINIDAD on: 5/25/2017 08:03 AM        Modules accepted: Orders

## 2021-07-03 NOTE — ADDENDUM NOTE
Addendum Note by David Perrin RN at 11/23/2019  4:09 PM     Author: David Perrin RN Service: -- Author Type: Registered Nurse    Filed: 11/23/2019  4:09 PM Encounter Date: 11/23/2019 Status: Signed    : David Perrin RN (Registered Nurse)    Addended by: DAVID PERRIN on: 11/23/2019 04:09 PM        Modules accepted: Orders

## 2021-10-19 ENCOUNTER — OFFICE VISIT (OUTPATIENT)
Dept: INTERNAL MEDICINE | Facility: CLINIC | Age: 86
End: 2021-10-19
Payer: MEDICARE

## 2021-10-19 VITALS
DIASTOLIC BLOOD PRESSURE: 67 MMHG | OXYGEN SATURATION: 98 % | SYSTOLIC BLOOD PRESSURE: 134 MMHG | HEART RATE: 63 BPM | WEIGHT: 173.2 LBS | BODY MASS INDEX: 31.94 KG/M2

## 2021-10-19 DIAGNOSIS — E66.01 MORBID OBESITY (H): ICD-10-CM

## 2021-10-19 DIAGNOSIS — Z23 HIGH PRIORITY FOR 2019-NCOV VACCINE: ICD-10-CM

## 2021-10-19 DIAGNOSIS — E11.9 TYPE 2 DIABETES MELLITUS WITHOUT COMPLICATION, WITHOUT LONG-TERM CURRENT USE OF INSULIN (H): Primary | ICD-10-CM

## 2021-10-19 DIAGNOSIS — I10 ESSENTIAL HYPERTENSION: ICD-10-CM

## 2021-10-19 LAB — HBA1C MFR BLD: 6.4 % (ref 0–5.6)

## 2021-10-19 PROCEDURE — 0004A COVID-19,PF,PFIZER (12+ YRS): CPT | Performed by: INTERNAL MEDICINE

## 2021-10-19 PROCEDURE — 91300 COVID-19,PF,PFIZER (12+ YRS): CPT | Performed by: INTERNAL MEDICINE

## 2021-10-19 PROCEDURE — 99213 OFFICE O/P EST LOW 20 MIN: CPT | Mod: 25 | Performed by: INTERNAL MEDICINE

## 2021-10-19 PROCEDURE — 36415 COLL VENOUS BLD VENIPUNCTURE: CPT | Performed by: INTERNAL MEDICINE

## 2021-10-19 PROCEDURE — G0008 ADMIN INFLUENZA VIRUS VAC: HCPCS | Performed by: INTERNAL MEDICINE

## 2021-10-19 PROCEDURE — 80048 BASIC METABOLIC PNL TOTAL CA: CPT | Performed by: INTERNAL MEDICINE

## 2021-10-19 PROCEDURE — 90662 IIV NO PRSV INCREASED AG IM: CPT | Performed by: INTERNAL MEDICINE

## 2021-10-19 PROCEDURE — 83036 HEMOGLOBIN GLYCOSYLATED A1C: CPT | Performed by: INTERNAL MEDICINE

## 2021-10-19 NOTE — PATIENT INSTRUCTIONS
1.  I will notify you of test results.    2.  Take aspirin daily.    3.  Recheck DM tests at time of Annual Wellness check    4.  Flu shot and third Pfizer Covid vaccine today.

## 2021-10-20 LAB
ANION GAP SERPL CALCULATED.3IONS-SCNC: 13 MMOL/L (ref 5–18)
BUN SERPL-MCNC: 15 MG/DL (ref 8–28)
CALCIUM SERPL-MCNC: 9.9 MG/DL (ref 8.5–10.5)
CHLORIDE BLD-SCNC: 103 MMOL/L (ref 98–107)
CO2 SERPL-SCNC: 24 MMOL/L (ref 22–31)
CREAT SERPL-MCNC: 0.76 MG/DL (ref 0.6–1.1)
GFR SERPL CREATININE-BSD FRML MDRD: 71 ML/MIN/1.73M2
GLUCOSE BLD-MCNC: 118 MG/DL (ref 70–125)
POTASSIUM BLD-SCNC: 4.2 MMOL/L (ref 3.5–5)
SODIUM SERPL-SCNC: 140 MMOL/L (ref 136–145)

## 2021-10-21 NOTE — PROGRESS NOTES
ASSESSMENT:  1. Type 2 diabetes mellitus without complication, without long-term current use of insulin (H)  Tanwy is on Metformin.  She has been working hard at diet and he has had significant weight loss.  Her home blood sugars look good.  Hemoglobin A1c and metabolic panel will be checked  - **A1C FUTURE 3mo; Future  - Basic metabolic panel  (Ca, Cl, CO2, Creat, Gluc, K, Na, BUN); Future  - **A1C FUTURE 3mo  - Basic metabolic panel  (Ca, Cl, CO2, Creat, Gluc, K, Na, BUN)    2. High priority for 2019-nCoV vaccine  She will receive her third Covid vaccine today.  She also would like an influenza vaccine  - COVID-19,PF,PFIZER    3. Obesity (BMI 35.0-39.9) with comorbidity (H)  Weight is down 27 pounds since her visit in December 2020.  She was commended for this.    4. Essential hypertension  On losartan-HCTZ and metoprolol.  Blood pressure is good on this regimen.    5.  Hypercholesterolemia: On simvastatin 40 mg daily    6.  Cardiac risk:  She was advised that the diagnosis of diabetes places her in a high risk category for future cardiac events.  She has been encouraged to take aspirin but is only taking it every other day.  She was encouraged to switch to daily    PLAN:  Patient Instructions   1.  I will notify you of test results.    2.  Take aspirin daily.    3.  Recheck DM tests at time of Annual Wellness check    4.  Flu shot and third Pfizer Covid vaccine today.      Orders Placed This Encounter   Procedures     COVID-19,PF,PFIZER     CT FLU VACCINE, INCREASED ANTIGEN, PRESV FREE     **A1C FUTURE 3mo     Basic metabolic panel  (Ca, Cl, CO2, Creat, Gluc, K, Na, BUN)     There are no discontinued medications.    Return in about 3 months (around 1/19/2022) for Routine preventive.    ASSESSED PROBLEMS:  See above,               CHIEF COMPLAINT:  Follow-up type 2 diabetes, essential hypertension, and other concerns    HISTORY OF PRESENT ILLNESS:  Tawny is a 87 year old female seen for follow-up of type 2  diabetes and essential hypertension.  She is treated with Metformin, and monitors home blood sugars.  Most have been excellent averaging below 150.  She has not had adverse effects from Metformin.  She has been working on weight loss.  She participated in diabetic education and felt it was of benefit.  Weight is down 27 pounds since last December.    She remains on metoprolol and losartan-HCTZ for hypertension.  Blood pressure is good on this regimen    REVIEW OF SYSTEMS:  She is interested in a third Pfizer Covid vaccine and an influenza vaccine today  Comprehensive review of systems is otherwise negative.    PFSH:  .  Retired RN    TOBACCO USE:  History   Smoking Status     Former Smoker   Smokeless Tobacco     Never Used     Comment: Quit smoking around 1960       VITALS:  Vitals:    10/19/21 1626   BP: 134/67   BP Location: Left arm   Patient Position: Sitting   Cuff Size: Adult Regular   Pulse: 63   SpO2: 98%   Weight: 78.6 kg (173 lb 3.2 oz)     Wt Readings from Last 3 Encounters:   10/19/21 78.6 kg (173 lb 3.2 oz)   04/30/21 84.1 kg (185 lb 4.8 oz)   12/28/20 90.7 kg (200 lb)       PHYSICAL EXAM:  Constitutional:   Reveals an alert pleasant talkative woman with appropriate affect.  She does not seem in acute distress.   Vitals: per nursing notes.  HEENT: Atraumatic  Eyes: Aphakia.  No conjunctival hyperemia  Neck:  Supple, no carotid bruits or adenopathy.  Back:  No spine or CVA pain.  Thorax:  No bony deformities.  Lungs: Clear to A&P without rales or wheezes.  Respiratory effort normal.  Cardiac:   Regular rate and rhythm, normal S1, S2, no murmur or gallop.  Abdomen:  Soft, active bowel sounds without bruits, mass, or tenderness..  Extremities:   No peripheral edema, pulses in the feet intact.    Skin:  No jaundice, peripheral cyanosis or lesions to suggest malignancy.  Neuro:  Alert and oriented. .  No gross focal deficits.  Psychiatric:  Memory intact, mood appropriate.    DATA  REVIEWED:  Additional History from Old Records Summarized (2): None.  Decision to Obtain Records (1): None.   Radiology Tests Summarized or Ordered (1): None.  Labs Reviewed or Ordered (1): None.  Medicine Test Summarized or Ordered (1): None.   Independent Review of EKG or X-RAY(2 each): None.    The visit lasted a total of 25 minutes face to face with the patient. Over 50% of the time was spent counseling and educating the patient about management of type 2 diabetes mellitus..      Dragon dictation was used for this note. Speech recognition errors are a possibility.     MEDICATIONS:  Current Outpatient Medications   Medication Sig Dispense Refill     antiox#10-om3-dha-epa-lut-zeax (I-CAPS) 280-10-2 mg cap [ANTIOX#10-OM3-DHA-EPA-LUT-ZEAX (I-CAPS) 280-10-2 MG CAP] Take by mouth.       aspirin 81 MG EC tablet [ASPIRIN 81 MG EC TABLET] Take 1 tablet (81 mg total) by mouth daily.  0     blood glucose meter (GLUCOMETER) [BLOOD GLUCOSE METER (GLUCOMETER)] Use 1 each As Directed daily. Dispense glucometer brand per patient's insurance at pharmacy discretion. 1 each 0     blood glucose test (CONTOUR NEXT TEST STRIPS) strips [BLOOD GLUCOSE TEST (CONTOUR NEXT TEST STRIPS) STRIPS] Use 1 each As Directed daily. Dispense brand per patient's insurance at pharmacy discretion. 50 strip 3     blood-glucose meter (CONTOUR NEXT METER) Misc [BLOOD-GLUCOSE METER (CONTOUR NEXT METER) MISC] test BG once daily, following manufacturers instructions 1 each 0     calcium-vitamin D (CALCIUM-VITAMIN D) 500 mg(1,250mg) -200 unit per tablet [CALCIUM-VITAMIN D (CALCIUM-VITAMIN D) 500 MG(1,250MG) -200 UNIT PER TABLET] Take 1 tablet by mouth daily.       co-enzyme Q-10 30 mg capsule [CO-ENZYME Q-10 30 MG CAPSULE] Take 30 mg by mouth daily.        cranberry 400 mg cap [CRANBERRY 400 MG CAP] Take by mouth.       diclofenac sodium (VOLTAREN) 1 % Gel [DICLOFENAC SODIUM (VOLTAREN) 1 % GEL] Four gram four times daily as needed for joint pain lower  extremities, two gm four times daily for small joints upper extremities. 100 g 6     generic lancets (FINGERSTIX LANCETS) [GENERIC LANCETS (FINGERSTIX LANCETS)] Dispense brand per patient's insurance at pharmacy discretion.  Check Bg once daily. 100 each 2     ibuprofen (ADVIL,MOTRIN) 200 MG tablet [IBUPROFEN (ADVIL,MOTRIN) 200 MG TABLET] Take 600 mg by mouth every 6 (six) hours as needed for pain.       KRILL/OM-3/DHA/EPA/PHOSPHO/AST (MEGARED OMEGA-3 KRILL OIL ORAL) [KRILL/OM-3/DHA/EPA/PHOSPHO/AST (MEGARED OMEGA-3 KRILL OIL ORAL)] Take 300 mg by mouth daily.       losartan-hydrochlorothiazide (HYZAAR) 50-12.5 mg per tablet [LOSARTAN-HYDROCHLOROTHIAZIDE (HYZAAR) 50-12.5 MG PER TABLET] TAKE 1 TABLET BY MOUTH DAILY 90 tablet 3     metFORMIN (GLUCOPHAGE-XR) 500 MG 24 hr tablet [METFORMIN (GLUCOPHAGE-XR) 500 MG 24 HR TABLET] TAKE 1 TABLET(500 MG) BY MOUTH DAILY WITH BREAKFAST 90 tablet 3     metoprolol succinate (TOPROL-XL) 50 MG 24 hr tablet [METOPROLOL SUCCINATE (TOPROL-XL) 50 MG 24 HR TABLET] Take 1 tablet (50 mg total) by mouth daily. 90 tablet 3     multivitamin with minerals (THERA-M) 9 mg iron-400 mcg Tab tablet [MULTIVITAMIN WITH MINERALS (THERA-M) 9 MG IRON-400 MCG TAB TABLET] Take 1 tablet by mouth daily.       omeprazole (PRILOSEC) 20 MG capsule [OMEPRAZOLE (PRILOSEC) 20 MG CAPSULE] Take 1 capsule by mouth every other day. 45 capsule 3     psyllium (METAMUCIL) 0.52 gram capsule [PSYLLIUM (METAMUCIL) 0.52 GRAM CAPSULE] Take 0.52 g by mouth daily.       simvastatin (ZOCOR) 40 MG tablet [SIMVASTATIN (ZOCOR) 40 MG TABLET] Take 1 tablet (40 mg total) by mouth at bedtime. 90 tablet 3     zinc gluconate 30 mg Tab [ZINC GLUCONATE 30 MG TAB] Take by mouth.

## 2022-01-03 ENCOUNTER — OFFICE VISIT (OUTPATIENT)
Dept: INTERNAL MEDICINE | Facility: CLINIC | Age: 87
End: 2022-01-03
Payer: MEDICARE

## 2022-01-03 VITALS
BODY MASS INDEX: 31.49 KG/M2 | HEART RATE: 60 BPM | SYSTOLIC BLOOD PRESSURE: 124 MMHG | WEIGHT: 171.1 LBS | HEIGHT: 62 IN | DIASTOLIC BLOOD PRESSURE: 62 MMHG | OXYGEN SATURATION: 98 %

## 2022-01-03 DIAGNOSIS — K21.9 GASTROESOPHAGEAL REFLUX DISEASE, UNSPECIFIED WHETHER ESOPHAGITIS PRESENT: ICD-10-CM

## 2022-01-03 DIAGNOSIS — C43.9 MALIGNANT MELANOMA, UNSPECIFIED SITE (H): ICD-10-CM

## 2022-01-03 DIAGNOSIS — M94.9 DISORDER OF BONE AND CARTILAGE: ICD-10-CM

## 2022-01-03 DIAGNOSIS — E66.01 MORBID OBESITY (H): ICD-10-CM

## 2022-01-03 DIAGNOSIS — H40.20X0 NARROW ANGLE GLAUCOMA OF RIGHT EYE: ICD-10-CM

## 2022-01-03 DIAGNOSIS — I10 ESSENTIAL HYPERTENSION: ICD-10-CM

## 2022-01-03 DIAGNOSIS — E11.65 TYPE 2 DIABETES MELLITUS WITH HYPERGLYCEMIA, WITHOUT LONG-TERM CURRENT USE OF INSULIN (H): ICD-10-CM

## 2022-01-03 DIAGNOSIS — I87.2 VENOUS (PERIPHERAL) INSUFFICIENCY: ICD-10-CM

## 2022-01-03 DIAGNOSIS — Z00.00 ENCOUNTER FOR MEDICARE ANNUAL WELLNESS EXAM: ICD-10-CM

## 2022-01-03 DIAGNOSIS — E78.2 MIXED HYPERLIPIDEMIA: ICD-10-CM

## 2022-01-03 DIAGNOSIS — Z00.00 MEDICARE ANNUAL WELLNESS VISIT, SUBSEQUENT: Primary | ICD-10-CM

## 2022-01-03 DIAGNOSIS — M89.9 DISORDER OF BONE AND CARTILAGE: ICD-10-CM

## 2022-01-03 LAB
ALBUMIN SERPL-MCNC: 4 G/DL (ref 3.5–5)
ALBUMIN UR-MCNC: NEGATIVE MG/DL
ALP SERPL-CCNC: 68 U/L (ref 45–120)
ALT SERPL W P-5'-P-CCNC: 21 U/L (ref 0–45)
ANION GAP SERPL CALCULATED.3IONS-SCNC: 11 MMOL/L (ref 5–18)
APPEARANCE UR: CLEAR
AST SERPL W P-5'-P-CCNC: 18 U/L (ref 0–40)
BACTERIA #/AREA URNS HPF: ABNORMAL /HPF
BILIRUB SERPL-MCNC: 0.6 MG/DL (ref 0–1)
BILIRUB UR QL STRIP: NEGATIVE
BUN SERPL-MCNC: 15 MG/DL (ref 8–28)
CALCIUM SERPL-MCNC: 9.5 MG/DL (ref 8.5–10.5)
CHLORIDE BLD-SCNC: 102 MMOL/L (ref 98–107)
CHOLEST SERPL-MCNC: 174 MG/DL
CO2 SERPL-SCNC: 28 MMOL/L (ref 22–31)
COLOR UR AUTO: YELLOW
CREAT SERPL-MCNC: 0.67 MG/DL (ref 0.6–1.1)
CREAT UR-MCNC: 72 MG/DL
ERYTHROCYTE [DISTWIDTH] IN BLOOD BY AUTOMATED COUNT: 13.5 % (ref 10–15)
FASTING STATUS PATIENT QL REPORTED: YES
GFR SERPL CREATININE-BSD FRML MDRD: 84 ML/MIN/1.73M2
GLUCOSE BLD-MCNC: 123 MG/DL (ref 70–125)
GLUCOSE UR STRIP-MCNC: NEGATIVE MG/DL
HBA1C MFR BLD: 6.4 % (ref 0–5.6)
HCT VFR BLD AUTO: 41.7 % (ref 35–47)
HDLC SERPL-MCNC: 40 MG/DL
HGB BLD-MCNC: 13.5 G/DL (ref 11.7–15.7)
HGB UR QL STRIP: ABNORMAL
KETONES UR STRIP-MCNC: NEGATIVE MG/DL
LDLC SERPL CALC-MCNC: 88 MG/DL
LEUKOCYTE ESTERASE UR QL STRIP: ABNORMAL
MCH RBC QN AUTO: 30.1 PG (ref 26.5–33)
MCHC RBC AUTO-ENTMCNC: 32.4 G/DL (ref 31.5–36.5)
MCV RBC AUTO: 93 FL (ref 78–100)
MICROALBUMIN UR-MCNC: 0.6 MG/DL (ref 0–1.99)
MICROALBUMIN/CREAT UR: 8.3 MG/G CR
NITRATE UR QL: POSITIVE
PH UR STRIP: 5.5 [PH] (ref 5–8)
PLATELET # BLD AUTO: 226 10E3/UL (ref 150–450)
POTASSIUM BLD-SCNC: 4.5 MMOL/L (ref 3.5–5)
PROT SERPL-MCNC: 7.2 G/DL (ref 6–8)
RBC # BLD AUTO: 4.48 10E6/UL (ref 3.8–5.2)
RBC #/AREA URNS AUTO: ABNORMAL /HPF
SODIUM SERPL-SCNC: 141 MMOL/L (ref 136–145)
SP GR UR STRIP: 1.02 (ref 1–1.03)
SQUAMOUS #/AREA URNS AUTO: ABNORMAL /LPF
TRIGL SERPL-MCNC: 229 MG/DL
UROBILINOGEN UR STRIP-ACNC: 0.2 E.U./DL
WBC # BLD AUTO: 8.7 10E3/UL (ref 4–11)
WBC #/AREA URNS AUTO: ABNORMAL /HPF
WBC CLUMPS #/AREA URNS HPF: PRESENT /HPF

## 2022-01-03 PROCEDURE — 36415 COLL VENOUS BLD VENIPUNCTURE: CPT | Performed by: INTERNAL MEDICINE

## 2022-01-03 PROCEDURE — 81001 URINALYSIS AUTO W/SCOPE: CPT | Performed by: INTERNAL MEDICINE

## 2022-01-03 PROCEDURE — 80053 COMPREHEN METABOLIC PANEL: CPT | Performed by: INTERNAL MEDICINE

## 2022-01-03 PROCEDURE — 83036 HEMOGLOBIN GLYCOSYLATED A1C: CPT | Performed by: INTERNAL MEDICINE

## 2022-01-03 PROCEDURE — G0439 PPPS, SUBSEQ VISIT: HCPCS | Performed by: INTERNAL MEDICINE

## 2022-01-03 PROCEDURE — 87086 URINE CULTURE/COLONY COUNT: CPT | Performed by: INTERNAL MEDICINE

## 2022-01-03 PROCEDURE — 80061 LIPID PANEL: CPT | Performed by: INTERNAL MEDICINE

## 2022-01-03 PROCEDURE — 85027 COMPLETE CBC AUTOMATED: CPT | Performed by: INTERNAL MEDICINE

## 2022-01-03 PROCEDURE — 82043 UR ALBUMIN QUANTITATIVE: CPT | Performed by: INTERNAL MEDICINE

## 2022-01-03 PROCEDURE — 87186 SC STD MICRODIL/AGAR DIL: CPT | Performed by: INTERNAL MEDICINE

## 2022-01-03 ASSESSMENT — ACTIVITIES OF DAILY LIVING (ADL): CURRENT_FUNCTION: NO ASSISTANCE NEEDED

## 2022-01-03 ASSESSMENT — MIFFLIN-ST. JEOR: SCORE: 1164.35

## 2022-01-03 NOTE — LETTER
January 4, 2022      Tawny Santos  [address] [place] [address]        Dear Tawny:    We are writing to inform you of your test results.    Diabetic control is good with a blood sugar 123 and a hemoglobin A1c of 6.4.  Continue the Metformin.  Total cholesterol is good at 174 with an LDL fraction of 88.  Continue simvastatin.  Triglycerides would decrease with further weight loss.  The urine microalbumin is in the normal range.  Other labs including your potassium, hemoglobin, liver and kidney tests are normal.  It was nice to see you.    Resulted Orders   Comprehensive metabolic panel   Result Value Ref Range    Sodium 141 136 - 145 mmol/L    Potassium 4.5 3.5 - 5.0 mmol/L    Chloride 102 98 - 107 mmol/L    Carbon Dioxide (CO2) 28 22 - 31 mmol/L    Anion Gap 11 5 - 18 mmol/L    Urea Nitrogen 15 8 - 28 mg/dL    Creatinine 0.67 0.60 - 1.10 mg/dL    Calcium 9.5 8.5 - 10.5 mg/dL    Glucose 123 70 - 125 mg/dL    Alkaline Phosphatase 68 45 - 120 U/L    AST 18 0 - 40 U/L    ALT 21 0 - 45 U/L    Protein Total 7.2 6.0 - 8.0 g/dL    Albumin 4.0 3.5 - 5.0 g/dL    Bilirubin Total 0.6 0.0 - 1.0 mg/dL    GFR Estimate 84 >60 mL/min/1.73m2      Comment:      Effective December 21, 2021 eGFRcr in adults is calculated using the 2021 CKD-EPI creatinine equation which includes age and gender (Alla salinas al., NEJ, DOI: 10.1056/YZKXfj9183426)   CBC with platelets   Result Value Ref Range    WBC Count 8.7 4.0 - 11.0 10e3/uL    RBC Count 4.48 3.80 - 5.20 10e6/uL    Hemoglobin 13.5 11.7 - 15.7 g/dL    Hematocrit 41.7 35.0 - 47.0 %    MCV 93 78 - 100 fL    MCH 30.1 26.5 - 33.0 pg    MCHC 32.4 31.5 - 36.5 g/dL    RDW 13.5 10.0 - 15.0 %    Platelet Count 226 150 - 450 10e3/uL   Lipid panel reflex to direct LDL Fasting   Result Value Ref Range    Cholesterol 174 <=199 mg/dL    Triglycerides 229 (H) <=149 mg/dL    Direct Measure HDL 40 (L) >=50 mg/dL      Comment:      HDL Cholesterol Reference Range:     0-2 years:   No  reference ranges established for patients under 2 years old  at [facility] for lipid analytes.    2-8 years:  Greater than 45 mg/dL     18 years and older:   Female: Greater than or equal to 50 mg/dL   Male:   Greater than or equal to 40 mg/dL    LDL Cholesterol Calculated 88 <=129 mg/dL    Patient Fasting > 8hrs? Yes    UA Macro with Reflex to Micro and Culture - lab collect   Result Value Ref Range    Color Urine Yellow Colorless, Straw, Light Yellow, Yellow    Appearance Urine Clear Clear    Glucose Urine Negative Negative mg/dL    Bilirubin Urine Negative Negative    Ketones Urine Negative Negative mg/dL    Specific Gravity Urine 1.020 1.005 - 1.030    Blood Urine Moderate (A) Negative    pH Urine 5.5 5.0 - 8.0    Protein Albumin Urine Negative Negative mg/dL    Urobilinogen Urine 0.2 0.2, 1.0 E.U./dL    Nitrite Urine Positive (A) Negative    Leukocyte Esterase Urine Trace (A) Negative   Hemoglobin A1c   Result Value Ref Range    Hemoglobin A1C 6.4 (H) 0.0 - 5.6 %      Comment:      Normal <5.7%   Prediabetes 5.7-6.4%    Diabetes 6.5% or higher     Note: Adopted from ADA consensus guidelines.   Albumin Random Urine Quantitative with Creat Ratio   Result Value Ref Range    Microalbumin Urine mg/dL 0.60 0.00 - 1.99 mg/dL    Creatinine Urine mg/dL 72 mg/dL    Microalbumin Urine mg/g Cr 8.3 <=19.9 mg/g Cr    Narrative    Microalbumin, Random Urine   <2.0 mg/dL . . . . . . . . Normal   3.0-30.0 mg/dL . . . . . . Microalbuminuria   >30.0 mg/dL . . . . . .  . Clinical Proteinuria     Microalbumin/Creatinine Ratio, Random Urine   <20 mg/g . . . . .. . . . Normal    mg/g . . . . . . . Microalbuminuria   >300 mg/g . . . . . . . . Clinical Proteinuria   Urine Microscopic Exam   Result Value Ref Range    Bacteria Urine Many (A) None Seen /HPF    RBC Urine 2-5 (A) 0-2 /HPF /HPF    WBC Urine 5-10 (A) 0-5 /HPF /HPF    Squamous Epithelials Urine Few (A) None Seen /LPF    WBC Clumps Urine Present (A) None Seen  /[facility]       If you have any questions or concerns, please call the clinic at the number listed above.       Sincerely,      Benito Medina MD

## 2022-01-03 NOTE — PATIENT INSTRUCTIONS
1.  I will notify you of test results    2.  DM recheck in three to six months.    3.  Continue current medications    4.  Recheck bone density in one to two years  Patient Education   Personalized Prevention Plan  You are due for the preventive services outlined below.  Your care team is available to assist you in scheduling these services.  If you have already completed any of these items, please share that information with your care team to update in your medical record.  Health Maintenance Due   Topic Date Due     ANNUAL REVIEW OF HM ORDERS  Never done       Understanding USDA MyPlate  The USDA has guidelines to help you make healthy food choices. These are called MyPlate. MyPlate shows the food groups that make up healthy meals using the image of a place setting. Before you eat, think about the healthiest choices for what to put on your plate or in your cup or bowl. To learn more about building a healthy plate, visit www.choosemyplate.gov.    The food groups    Fruits. Any fruit or 100% fruit juice counts as part of the Fruit Group. Fruits may be fresh, canned, frozen, or dried, and may be whole, cut-up, or pureed. Make 1/2 of your plate fruits and vegetables.    Vegetables. Any vegetable or 100% vegetable juice counts as a member of the Vegetable Group. Vegetables may be fresh, frozen, canned, or dried. They can be served raw or cooked and may be whole, cut-up, or mashed. Make 1/2 of your plate fruits and vegetables.    Grains. All foods made from grains are part of the Grains Group. These include wheat, rice, oats, cornmeal, and barley. Grains are often used to make foods such as bread, pasta, oatmeal, cereal, tortillas, and grits. Grains should be no more than 1/4 of your plate. At least half of your grains should be whole grains.    Protein. This group includes meat, poultry, seafood, beans and peas, eggs, processed soy products (such as tofu), nuts (including nut butters), and seeds. Make protein choices  no more than 1/4 of your plate. Meat and poultry choices should be lean or low fat.    Dairy. The Dairy Group includes all fluid milk products and foods made from milk that contain calcium, such as yogurt and cheese. (Foods that have little calcium, such as cream, butter, and cream cheese, are not part of this group.) Most dairy choices should be low-fat or fat-free.    Oils. Oils aren't a food group, but they do contain essential nutrients. However it's important to watch your intake of oils. These are fats that are liquid at room temperature. They include canola, corn, olive, soybean, vegetable, and sunflower oil. Foods that are mainly oil include mayonnaise, certain salad dressings, and soft margarines. You likely already get your daily oil allowance from the foods you eat.  Things to limit  Eating healthy also means limiting these things in your diet:       Salt (sodium). Many processed foods have a lot of sodium. To keep sodium intake down, eat fresh vegetables, meats, poultry, and seafood when possible. Purchase low-sodium, reduced-sodium, or no-salt-added food products at the store. And don't add salt to your meals at home. Instead, season them with herbs and spices such as dill, oregano, cumin, and paprika. Or try adding flavor with lemon or lime zest and juice.    Saturated fat. Saturated fats are most often found in animal products such as beef, pork, and chicken. They are often solid at room temperature, such as butter. To reduce your saturated fat intake, choose leaner cuts of meat and poultry. And try healthier cooking methods such as grilling, broiling, roasting, or baking. For a simple lower-fat swap, use plain nonfat yogurt instead of mayonnaise when making potato salad or macaroni salad.    Added sugars. These are sugars added to foods. They are in foods such as ice cream, candy, soda, fruit drinks, sports drinks, energy drinks, cookies, pastries, jams, and syrups. Cut down on added sugars by  sharing sweet treats with a family member or friend. You can also choose fruit for dessert, and drink water or other unsweetened beverages.     [employer] last reviewed this educational content on 6/1/2020 2000-2021 [employer]. All rights reserved. This information is not intended as a substitute for professional medical care. Always follow your healthcare professional's instructions.          Signs of Hearing Loss      Hearing much better with one ear can be a sign of hearing loss.   Hearing loss is a problem shared by many people. In fact, it is one of the most common health problems, particularly as people age. Most people age 65 and older have some hearing loss. By age 80, almost everyone does. Hearing loss often occurs slowly over the years. So you may not realize your hearing has gotten worse.  Have your hearing checked  Call your healthcare provider if you:    Have to strain to hear normal conversation    Have to watch other people s faces very carefully to follow what they re saying    Need to ask people to repeat what they ve said    Often misunderstand what people are saying    Turn the volume of the television or radio up so high that others complain    Feel that people are mumbling when they re talking to you    Find that the effort to hear leaves you feeling tired and irritated    Notice, when using the phone, that you hear better with one ear than the other  [employer] last reviewed this educational content on 1/1/2020 2000-2021 [employer]. All rights reserved. This information is not intended as a substitute for professional medical care. Always follow your healthcare professional's instructions.          Urinary Incontinence, Female (Adult)   Urinary incontinence means loss of bladder control. This problem affects many women, especially as they get older. If you have incontinence, you may be embarrassed to ask for help. But know that this problem can be treated.    Types of Incontinence  There are different types of incontinence. Two of the main types are described here. You can have more than one type.     Stress incontinence. With this type, urine leaks when pressure (stress) is put on the bladder. This may happen when you cough, sneeze, or laugh. Stress incontinence most often occurs because the pelvic floor muscles that support the bladder and urethra are weak. This can happen after pregnancy and vaginal childbirth or a hysterectomy. It can also be due to excess body weight or hormone changes.    Urge incontinence (also called overactive bladder). With this type, a sudden urge to urinate is felt often. This may happen even though there may not be much urine in the bladder. The need to urinate often during the night is common. Urge incontinence most often occurs because of bladder spasms. This may be due to bladder irritation or infection. Damage to bladder nerves or pelvic muscles, constipation, and certain medicines can also lead to urge incontinence.  Treatment depends on the cause. Further evaluation is needed to find the type you have. This will likely include an exam and certain tests. Based on the results, you and your healthcare provider can then plan treatment. Until a diagnosis is made, the home care tips below can help ease symptoms.   Home care    Do pelvic floor muscle exercises, if they are prescribed. The pelvic floor muscles help support the bladder and urethra. Many women find that their symptoms improve when doing special exercises that strengthen these muscles. To do the exercises, contract the muscles you would use to stop your stream of urine. But do this when you re not urinating. Hold for 10 seconds, then relax. Repeat 10 to 20 times in a row, at least 3 times a day. Your healthcare provider may give you other instructions for how to do the exercises and how often.    Keep a bladder diary. This helps track how often and how much you urinate over a  set period of time. Bring this diary with you to your next visit with the provider. The information can help your provider learn more about your bladder problem.    Lose weight, if advised to by your provider. Extra weight puts pressure on the bladder. Your provider can help you create a weight-loss plan that s right for you. This may include exercising more and making certain diet changes.    Don't have foods and drinks that may irritate the bladder. These can include alcohol and caffeinated drinks.    Quit smoking. Smoking and other tobacco use can lead to a long-term (chronic) cough that strains the pelvic floor muscles. Smoking may also damage the bladder and urethra. Talk with your provider about treatments or methods you can use to quit smoking.    If drinking large amounts of fluid makes you have symptoms, you may be advised to limit your fluid intake. You may also be advised to drink most of your fluids during the day and to limit fluids at night.    If you re worried about urine leakage or accidents, you may wear absorbent pads to catch urine. Change the pads often. This helps reduce discomfort. It may also reduce the risk of skin or bladder infections.    Follow-up care  Follow up with your healthcare provider, or as directed. It may take some to find the right treatment for your problem. But healthy lifestyle changes can be made right away. These include such things as exercising on a regular basis, eating a healthy diet, losing weight (if needed), and quitting smoking. Your treatment plan may include special therapies or medicines. Certain procedures or surgery may also be options. Talk about any questions you have with your provider.   When to seek medical advice  Call the healthcare provider right away if any of these occur:    Fever of 100.4 F (38 C) or higher, or as directed by your provider    Bladder pain or fullness    Belly swelling    Nausea or vomiting    Back pain    Weakness, dizziness, or  mahogany Hines last reviewed this educational content on 1/1/2020 2000-2021 [employer]. All rights reserved. This information is not intended as a substitute for professional medical care. Always follow your healthcare professional's instructions.

## 2022-01-03 NOTE — LETTER
"January 10, 2022      Tawny Santos  [address] [place] [address]        Dear Tawny:    We are writing to inform you of your test results.    The urine analysis showed only a small number of white blood cells.  The urine culture returned positive for 2 different strains of E. coli.  If you do not have symptoms to suggest infection, this represents \"asymptomatic bacteriuria \".  Infectious disease experts do not recommend antibiotic treatment without the symptoms.    Resulted Orders   Comprehensive metabolic panel   Result Value Ref Range    Sodium 141 136 - 145 mmol/L    Potassium 4.5 3.5 - 5.0 mmol/L    Chloride 102 98 - 107 mmol/L    Carbon Dioxide (CO2) 28 22 - 31 mmol/L    Anion Gap 11 5 - 18 mmol/L    Urea Nitrogen 15 8 - 28 mg/dL    Creatinine 0.67 0.60 - 1.10 mg/dL    Calcium 9.5 8.5 - 10.5 mg/dL    Glucose 123 70 - 125 mg/dL    Alkaline Phosphatase 68 45 - 120 U/L    AST 18 0 - 40 U/L    ALT 21 0 - 45 U/L    Protein Total 7.2 6.0 - 8.0 g/dL    Albumin 4.0 3.5 - 5.0 g/dL    Bilirubin Total 0.6 0.0 - 1.0 mg/dL    GFR Estimate 84 >60 mL/min/1.73m2      Comment:      Effective December 21, 2021 eGFRcr in adults is calculated using the 2021 CKD-EPI creatinine equation which includes age and gender (Alla et al., NE, DOI: 10.1056/UQUVhk7480932)   CBC with platelets   Result Value Ref Range    WBC Count 8.7 4.0 - 11.0 10e3/uL    RBC Count 4.48 3.80 - 5.20 10e6/uL    Hemoglobin 13.5 11.7 - 15.7 g/dL    Hematocrit 41.7 35.0 - 47.0 %    MCV 93 78 - 100 fL    MCH 30.1 26.5 - 33.0 pg    MCHC 32.4 31.5 - 36.5 g/dL    RDW 13.5 10.0 - 15.0 %    Platelet Count 226 150 - 450 10e3/uL   Lipid panel reflex to direct LDL Fasting   Result Value Ref Range    Cholesterol 174 <=199 mg/dL    Triglycerides 229 (H) <=149 mg/dL    Direct Measure HDL 40 (L) >=50 mg/dL      Comment:      HDL Cholesterol Reference Range:     0-2 years:   No reference ranges established for patients under 2 years old  at [facility] for lipid analytes.    2-8 years:  Greater than 45 mg/dL     18 years and older:   Female: Greater than or equal to 50 mg/dL   Male:   Greater than or equal to 40 mg/dL    LDL Cholesterol Calculated 88 <=129 mg/dL    Patient Fasting > 8hrs? Yes    UA Macro with Reflex to Micro and Culture - lab collect   Result Value Ref Range    Color Urine Yellow Colorless, Straw, Light Yellow, Yellow    Appearance Urine Clear Clear    Glucose Urine Negative Negative mg/dL    Bilirubin Urine Negative Negative    Ketones Urine Negative Negative mg/dL    Specific Gravity Urine 1.020 1.005 - 1.030    Blood Urine Moderate (A) Negative    pH Urine 5.5 5.0 - 8.0    Protein Albumin Urine Negative Negative mg/dL    Urobilinogen Urine 0.2 0.2, 1.0 E.U./dL    Nitrite Urine Positive (A) Negative    Leukocyte Esterase Urine Trace (A) Negative   Hemoglobin A1c   Result Value Ref Range    Hemoglobin A1C 6.4 (H) 0.0 - 5.6 %      Comment:      Normal <5.7%   Prediabetes 5.7-6.4%    Diabetes 6.5% or higher     Note: Adopted from ADA consensus guidelines.   Albumin Random Urine Quantitative with Creat Ratio   Result Value Ref Range    Microalbumin Urine mg/dL 0.60 0.00 - 1.99 mg/dL    Creatinine Urine mg/dL 72 mg/dL    Microalbumin Urine mg/g Cr 8.3 <=19.9 mg/g Cr    Narrative    Microalbumin, Random Urine   <2.0 mg/dL . . . . . . . . Normal   3.0-30.0 mg/dL . . . . . . Microalbuminuria   >30.0 mg/dL . . . . . .  . Clinical Proteinuria     Microalbumin/Creatinine Ratio, Random Urine   <20 mg/g . . . . .. . . . Normal    mg/g . . . . . . . Microalbuminuria   >300 mg/g . . . . . . . . Clinical Proteinuria   Urine Microscopic Exam   Result Value Ref Range    Bacteria Urine Many (A) None Seen /HPF    RBC Urine 2-5 (A) 0-2 /HPF /HPF    WBC Urine 5-10 (A) 0-5 /HPF /HPF    Squamous Epithelials Urine Few (A) None Seen /LPF    WBC Clumps Urine Present (A) None Seen /HPF   Urine Culture   Result Value Ref Range    Culture >100,000 CFU/mL  Escherichia coli (A)     Culture 50,000-100,000 CFU/mL Escherichia coli (A)        If you have any questions or concerns, please call the clinic at the number listed above.       Sincerely,      Benito Medina MD

## 2022-01-03 NOTE — PROGRESS NOTES
"SUBJECTIVE:   Tawny Santos is a 87 year old female who presents for Preventive Visit.      Patient has been advised of split billing requirements and indicates understanding: Yes   Are you in the first 12 months of your Medicare coverage?  No    Health Maintenance: She has a stationary bike that she does 5 days weekly in addition to stretching. She does her best to eat a healthy, well-balanced diet. She eats about two times daily with an occasional snack. She walks with a cane when she leaves the house. She reports no falls. Due for mammogram at this time. Her last colonoscopy was in 2011 and she does not need f/u at this time.     Osteoporosis: She has been on alendronate in the past as well as an IV medication that she does not remember the name of. Last DEXA completed in Jan 2021 showing low t-score of -1.9. Currently on calcium-vit D supplement.     T2DM: She checks her blood sugars about once weekly. Fasting BS range 130-140s, 2 hr post-prandial run 140-160s. She sees ophthalmology twice yearly. On metformin 500 mg daily, tolerating well. She denies any numbness/tingling in her feet.     HTN: Blood pressures have been under good control. Continues to take losartan-hydrochlorothiazide daily and MTP XL daily without adverse effects.     GERD: Continues to take omeprazole every other day, reflux controlled with this regimen.       Healthy Habits:     In general, how would you rate your overall health?  Good    Frequency of exercise:  4-5 days/week    Duration of exercise:  Less than 15 minutes    Do you usually eat at least 4 servings of fruit and vegetables a day, include whole grains    & fiber and avoid regularly eating high fat or \"junk\" foods?  No    Taking medications regularly:  Yes    Medication side effects:  Not applicable    Ability to successfully perform activities of daily living:  No assistance needed    Home Safety:  No safety concerns identified    Hearing Impairment:  Need to ask people to " speak up or repeat themselves    In the past 6 months, have you been bothered by leaking of urine? Yes    In general, how would you rate your overall mental or emotional health?  Excellent      PHQ-2 Total Score: 0    Additional concerns today:  No    Do you feel safe in your environment? Yes    Have you ever done Advance Care Planning? (For example, a Health Directive, POLST, or a discussion with a medical provider or your loved ones about your wishes): Yes, advance care planning is on file.       Fall risk  Fallen 2 or more times in the past year?: No  Any fall with injury in the past year?: No    Cognitive Screening   1) Repeat 3 items (Leader, Season, Table)    2) Clock draw: NORMAL  3) 3 item recall: Recalls 2 objects   Results: NORMAL clock, 1-2 items recalled: COGNITIVE IMPAIRMENT LESS LIKELY    Mini-CogTM Copyright S Stevie. Licensed by the author for use in [facility]; reprinted with permission (princess@[facility]). All rights reserved.      Do you have sleep apnea, excessive snoring or daytime drowsiness?: no    Reviewed and updated as needed this visit by clinical staff  Tobacco  Allergies  Meds   Med Hx  Surg Hx  Fam Hx         Reviewed and updated as needed this visit by Provider               Social History   .  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 No     Current providers sharing in care for this patient include:   Patient Care Team:  Benito Medina MD as PCP - General (Internal Medicine)  Beniot Medina MD as Assigned PCP    The following health maintenance items are reviewed in Epic and correct as of today:  Health Maintenance Due   Topic Date Due     ANNUAL REVIEW OF   ORDERS  Never done     Lab work is in process  Labs reviewed in EPIC  BP Readings from Last 3 Encounters:   01/03/22 124/62   10/19/21 134/67   04/30/21 128/70    Wt Readings from Last 3 Encounters:   01/03/22 77.6 kg (171 lb 1.6 oz)   10/19/21 78.6 kg (173 lb 3.2 oz)   04/30/21 84.1 kg (185 lb 4.8 oz)         Current Outpatient Medications   Medication Sig Dispense Refill     antiox#10-om3-dha-epa-lut-zeax (I-CAPS) 280-10-2 mg cap [ANTIOX#10-OM3-DHA-EPA-LUT-ZEAX (I-CAPS) 280-10-2 MG CAP] Take by mouth.       aspirin 81 MG EC tablet [ASPIRIN 81 MG EC TABLET] Take 1 tablet (81 mg total) by mouth daily.  0     blood glucose meter (GLUCOMETER) [BLOOD GLUCOSE METER (GLUCOMETER)] Use 1 each As Directed daily. Dispense glucometer brand per patient's insurance at pharmacy discretion. 1 each 0     blood glucose test (CONTOUR NEXT TEST STRIPS) strips [BLOOD GLUCOSE TEST (CONTOUR NEXT TEST STRIPS) STRIPS] Use 1 each As Directed daily. Dispense brand per patient's insurance at pharmacy discretion. 50 strip 3     blood-glucose meter (CONTOUR NEXT METER) Misc [BLOOD-GLUCOSE METER (CONTOUR NEXT METER) MISC] test BG once daily, following manufacturers instructions 1 each 0     calcium-vitamin D (CALCIUM-VITAMIN D) 500 mg(1,250mg) -200 unit per tablet [CALCIUM-VITAMIN D (CALCIUM-VITAMIN D) 500 MG(1,250MG) -200 UNIT PER TABLET] Take 1 tablet by mouth daily.       co-enzyme Q-10 30 mg capsule [CO-ENZYME Q-10 30 MG CAPSULE] Take 30 mg by mouth daily.        cranberry 400 mg cap [CRANBERRY 400 MG CAP] Take by mouth.       diclofenac sodium (VOLTAREN) 1 % Gel [DICLOFENAC SODIUM (VOLTAREN) 1 % GEL] Four gram four times daily as needed for joint pain lower extremities, two gm four times daily for small joints upper extremities. 100 g 6     generic lancets (FINGERSTIX LANCETS) [GENERIC LANCETS (FINGERSTIX LANCETS)] Dispense brand per patient's insurance at pharmacy discretion.  Check Bg once daily. 100 each 2     ibuprofen (ADVIL,MOTRIN)  200 MG tablet [IBUPROFEN (ADVIL,MOTRIN) 200 MG TABLET] Take 600 mg by mouth every 6 (six) hours as needed for pain.       KRILL/OM-3/DHA/EPA/PHOSPHO/AST (MEGARED OMEGA-3 KRILL OIL ORAL) [KRILL/OM-3/DHA/EPA/PHOSPHO/AST (MEGARED OMEGA-3 KRILL OIL ORAL)] Take 300 mg by mouth daily.       losartan-hydrochlorothiazide (HYZAAR) 50-12.5 mg per tablet [LOSARTAN-HYDROCHLOROTHIAZIDE (HYZAAR) 50-12.5 MG PER TABLET] TAKE 1 TABLET BY MOUTH DAILY 90 tablet 3     metFORMIN (GLUCOPHAGE-XR) 500 MG 24 hr tablet [METFORMIN (GLUCOPHAGE-XR) 500 MG 24 HR TABLET] TAKE 1 TABLET(500 MG) BY MOUTH DAILY WITH BREAKFAST 90 tablet 3     metoprolol succinate (TOPROL-XL) 50 MG 24 hr tablet [METOPROLOL SUCCINATE (TOPROL-XL) 50 MG 24 HR TABLET] Take 1 tablet (50 mg total) by mouth daily. 90 tablet 3     multivitamin with minerals (THERA-M) 9 mg iron-400 mcg Tab tablet [MULTIVITAMIN WITH MINERALS (THERA-M) 9 MG IRON-400 MCG TAB TABLET] Take 1 tablet by mouth daily.       omeprazole (PRILOSEC) 20 MG capsule [OMEPRAZOLE (PRILOSEC) 20 MG CAPSULE] Take 1 capsule by mouth every other day. 45 capsule 3     psyllium (METAMUCIL) 0.52 gram capsule [PSYLLIUM (METAMUCIL) 0.52 GRAM CAPSULE] Take 0.52 g by mouth daily.       simvastatin (ZOCOR) 40 MG tablet [SIMVASTATIN (ZOCOR) 40 MG TABLET] Take 1 tablet (40 mg total) by mouth at bedtime. 90 tablet 3     zinc gluconate 30 mg Tab [ZINC GLUCONATE 30 MG TAB] Take by mouth.       Allergies   Allergen Reactions     Lisinopril Cough     Morphine Other (See Comments)     Pt temporarily stopped breathing     Sulfa (Sulfonamide Antibiotics) [Sulfa Drugs] Hives and Rash     Recent Labs   Lab Test 10/19/21  1706 04/30/21  1227 01/18/21  1202 12/28/20  1218 12/23/19  1514 11/19/18  1216   A1C 6.4* 6.1* 8.0*  --   --   --    LDL  --  77  --  81 83 76   HDL  --  34*  --  36* 42* 40*   TRIG  --  210*  --  460* 287* 266*   ALT  --   --   --  28 16 20   CR 0.76 0.75  --  0.75 0.77 0.68   GFRESTIMATED 71 >60  --  >60 >60 >60  "  GFRESTBLACK  --  >60  --  >60 >60 >60   POTASSIUM 4.2 4.2  --  4.3 3.9 4.2      Pneumonia Vaccine:Adults age 65+ who received Pneumovax (PPSV23) at 65 years or older: Should be given PCV13 > 1 year after their most recent PPSV23    Mammogram Screening - Patient over age 75, has elected to discontinue screenings.  Pertinent mammograms are reviewed under the imaging tab.    Review of Systems  Constitutional, HEENT, cardiovascular, pulmonary, GI, , musculoskeletal, neuro, skin, endocrine and psych systems are negative, except as otherwise noted.    OBJECTIVE:   /62 (BP Location: Left arm, Patient Position: Sitting, Cuff Size: Adult Regular)   Pulse 60   Ht 1.575 m (5' 2\")   Wt 77.6 kg (171 lb 1.6 oz)   SpO2 98%   BMI 31.29 kg/m   Estimated body mass index is 31.29 kg/m  as calculated from the following:    Height as of this encounter: 1.575 m (5' 2\").    Weight as of this encounter: 77.6 kg (171 lb 1.6 oz).  Physical Exam  GENERAL APPEARANCE: healthy, alert and no distress  EYES: Eyes grossly normal to inspection, PERRL and conjunctivae and sclerae normal  HENT: ear canals and TM's normal, nose and mouth without ulcers or lesions, oropharynx clear and oral mucous membranes moist  NECK: no adenopathy, no asymmetry, masses, or scars and thyroid normal to palpation  RESP: lungs clear to auscultation - no rales, rhonchi or wheezes  BREAST: normal without masses, tenderness or nipple discharge and no palpable axillary masses or adenopathy  CV: regular rate and rhythm, normal S1 S2, no S3 or S4, no murmur, click or rub, no peripheral edema and peripheral pulses strong  ABDOMEN: soft, nontender, no hepatosplenomegaly, no masses and bowel sounds normal  MS: no musculoskeletal defects are noted and gait is age appropriate without ataxia  SKIN: no suspicious lesions or rashes  NEURO: Normal strength and tone, sensory exam grossly normal, mentation intact and speech normal  DIABETIC FOOT EXAM: normal DP and PT " pulses, no trophic changes or ulcerative lesions and normal sensory exam  PSYCH: mentation appears normal and affect normal/bright    Diagnostic Test Results: mammogram 01/2019 reviewed and normal, DEXA from 01/2019 showing low t-score of -1.9.  Labs reviewed in Epic: BMP and A1c from 10/19/21.    ASSESSMENT / PLAN:   Tawny was seen today for wellness visit.    Diagnoses and all orders for this visit:    1. 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 Independent in activities of daily living. Exercises regularly. Overall feeling well.     2. Type 2 diabetes mellitus with hyperglycemia, without long-term current use of insulin (H)  She remains on metformin  mg daily and has been tolerating the medication well. Checks blood sugars occasionally which range 130-140s fasting. She continues to watch her diet and exercises ~5x per week. Eye exam completed twice yearly. Hemoglobin A1c 6.4 at last visit, will plan to recheck A1c, microalbumin, lipid panel, CMP today.   - Comprehensive metabolic panel; Future  - Lipid panel reflex to direct LDL Fasting; Future  - Hemoglobin A1c; Future  - Albumin Random Urine Quantitative with Creat Ratio; Future  - Comprehensive metabolic panel  - Lipid panel reflex to direct LDL Fasting  - Hemoglobin A1c  - Albumin Random Urine Quantitative with Creat Ratio    3. Obesity (BMI 35.0-39.9) with comorbidity (H)  Weight is down 3 lbs since last visit although patient reports that she was 165 lbs at her home scale (which is down). She continues to watch what she eats and exercises regularly.     4. Essential hypertension  On losartan-hydrochlorothiazide and MTP daily. Blood pressure controlled on this regimen; continue same.   - Comprehensive metabolic panel; Future  - UA Macro with Reflex to Micro and Culture - lab collect; Future  - Comprehensive metabolic panel  - UA Macro with Reflex to Micro and Culture - lab  "collect    5. Venous Insufficiency  Notices swelling in both of her legs, left worse than right. Wears compression stockings daily.     6. Malignant melanoma, unspecified site (H)  She has a history of melanoma on her face. Follows with dermatology.     7. Narrow angle glaucoma of right eye  Follows with ophthalmology twice yearly.     8. Osteopenia  DEXA completed in 01/2019 showing low t-score of -1.9. Pt states she has been on alendronate in the past, possibly Reclast but she is not entirely sure of the name. On calcium-vit D supplement at this time, no other medication for bones. Plan to repeat DEXA in 1 year.    9. Gastroesophageal reflux disease, unspecified whether esophagitis present  Continues taking omeprazole 40 mg every other day for reflux symptoms, well controlled. Continue same.   - CBC with platelets; Future  - CBC with platelets    10. Mixed hyperlipidemia  Continues on simvastatin 40 mg daily and ASA 81 mg every other day. Checking lipid panel today. Continue same medications.   - Lipid panel reflex to direct LDL Fasting; Future  - Lipid panel reflex to direct LDL Fasting      Patient has been advised of split billing requirements and indicates understanding: Yes  COUNSELING:  Reviewed preventive health counseling, as reflected in patient instructions       Regular exercise       Healthy diet/nutrition       Vision screening       Aspirin prophylaxis        Osteoporosis prevention/bone health    Estimated body mass index is 31.29 kg/m  as calculated from the following:    Height as of this encounter: 1.575 m (5' 2\").    Weight as of this encounter: 77.6 kg (171 lb 1.6 oz).    Weight management plan: Discussed healthy diet and exercise guidelines pt exercises 5x weekly on stationary exercise bike.    She reports that she has quit smoking. She has never used smokeless tobacco.      Reviewed patients plan of care and provided an AVS. The Basic Care Plan (routine screening as documented in Health " Maintenance) for Tawny meets the Care Plan requirement. This Care Plan has been established and reviewed with the Patient.    Counseling Resources:  ATP IV Guidelines  Pooled Cohorts Equation Calculator  Breast Cancer Risk Calculator  Breast Cancer: Medication to Reduce Risk  FRAX Risk Assessment  ICSI Preventive Guidelines  Dietary Guidelines for Americans, 2010  USDA's MyPlate  ASA Prophylaxis  Lung CA Screening      The visit lasted a total of 48 minutes face to face with the patient. Over 50% of the time was spent counseling and educating the patient about T2DM, HTN, and health maintenance.    The patient was seen and discussed with Dr.Gary Medina, who agrees with the above assessment/plan.    Karen Ryder, MS4      Addendum:  Patient was seen and examined.  Note reviewed.  Benito Medina MD  [facility]    Identified Health Risks: Appropriate preventive services were discussed with this patient, including applicable screening as appropriate for cardiovascular disease, diabetes, osteopenia/osteoporosis, and glaucoma.  As appropriate for age/gender, discussed screening for colorectal cancer, prostate cancer, breast cancer, and cervical cancer. Checklist reviewing preventive services available has been given to the patient.

## 2022-01-05 LAB
BACTERIA UR CULT: ABNORMAL
BACTERIA UR CULT: ABNORMAL

## 2022-01-06 DIAGNOSIS — K21.9 ESOPHAGEAL REFLUX: ICD-10-CM

## 2022-01-06 DIAGNOSIS — E11.65 TYPE 2 DIABETES MELLITUS WITH HYPERGLYCEMIA, WITHOUT LONG-TERM CURRENT USE OF INSULIN (H): ICD-10-CM

## 2022-01-06 DIAGNOSIS — E78.2 MIXED HYPERLIPIDEMIA: ICD-10-CM

## 2022-01-06 DIAGNOSIS — I10 ESSENTIAL HYPERTENSION, BENIGN: ICD-10-CM

## 2022-01-06 RX ORDER — SIMVASTATIN 40 MG
40 TABLET ORAL AT BEDTIME
Qty: 90 TABLET | Refills: 3 | Status: SHIPPED | OUTPATIENT
Start: 2022-01-06 | End: 2022-02-05

## 2022-01-06 RX ORDER — SIMVASTATIN 40 MG
40 TABLET ORAL AT BEDTIME
Qty: 30 TABLET | Refills: 0 | Status: SHIPPED | OUTPATIENT
Start: 2022-01-06 | End: 2022-01-06

## 2022-01-06 RX ORDER — LOSARTAN POTASSIUM AND HYDROCHLOROTHIAZIDE 12.5; 5 MG/1; MG/1
TABLET ORAL
Qty: 90 TABLET | Refills: 3 | Status: SHIPPED | OUTPATIENT
Start: 2022-01-06 | End: 2022-02-05

## 2022-01-06 RX ORDER — METOPROLOL SUCCINATE 50 MG/1
50 TABLET, EXTENDED RELEASE ORAL DAILY
Qty: 30 TABLET | Refills: 0 | Status: SHIPPED | OUTPATIENT
Start: 2022-01-06 | End: 2022-01-06

## 2022-01-06 RX ORDER — METFORMIN HCL 500 MG
TABLET, EXTENDED RELEASE 24 HR ORAL
Qty: 90 TABLET | Refills: 3 | Status: SHIPPED | OUTPATIENT
Start: 2022-01-06 | End: 2022-02-05

## 2022-01-06 RX ORDER — LOSARTAN POTASSIUM AND HYDROCHLOROTHIAZIDE 12.5; 5 MG/1; MG/1
TABLET ORAL
Qty: 30 TABLET | Refills: 0 | Status: SHIPPED | OUTPATIENT
Start: 2022-01-06 | End: 2022-01-06

## 2022-01-06 RX ORDER — METFORMIN HCL 500 MG
TABLET, EXTENDED RELEASE 24 HR ORAL
Qty: 30 TABLET | Refills: 0 | Status: SHIPPED | OUTPATIENT
Start: 2022-01-06 | End: 2022-01-06

## 2022-01-06 RX ORDER — METOPROLOL SUCCINATE 50 MG/1
50 TABLET, EXTENDED RELEASE ORAL DAILY
Qty: 90 TABLET | Refills: 3 | Status: SHIPPED | OUTPATIENT
Start: 2022-01-06 | End: 2022-02-05

## 2022-01-06 NOTE — TELEPHONE ENCOUNTER
Patient calling to request refills of:     Losartan  Simvastatin  Metoprolol  Metformin  Omeprazole    She says her daughter is trying to get her set up on a mailorder and that the prescriptions should be written for 30 days not 90 days.     These should be sent to:    [id number] - [place], [place] - [address] AT [facility] [address]  [phone]    She will be out of two of her medications tomorrow and will not have meds for the weekend.

## 2022-01-06 NOTE — TELEPHONE ENCOUNTER
Writer spoke to Tawny.  Patient informed that we will send 30 day supply to local pharmacy.  Once she knows which mail order pharmacy she wants to use, she or her daughter will call back to have her prescriptions for 90 days to be sent to mail order.

## 2022-01-06 NOTE — TELEPHONE ENCOUNTER
Patient now needs these five medications sent to her new mail order pharmacy of [facility].  They were sent earlier today to local pharmacy while waiting getting a supply from [facility].    Tawny Hopkins's daughter: Phone number [phone]

## 2022-01-31 ENCOUNTER — TRANSFERRED RECORDS (OUTPATIENT)
Dept: HEALTH INFORMATION MANAGEMENT | Facility: CLINIC | Age: 87
End: 2022-01-31
Payer: MEDICARE

## 2022-01-31 LAB — RETINOPATHY: NEGATIVE

## 2022-03-15 DIAGNOSIS — E11.65 TYPE 2 DIABETES MELLITUS WITH HYPERGLYCEMIA, WITHOUT LONG-TERM CURRENT USE OF INSULIN (H): ICD-10-CM

## 2022-03-15 NOTE — TELEPHONE ENCOUNTER
Reason for Call:  Other call back    Detailed comments: Pt needing a Rx:  Accu chek guide test strips and  Accu chek fast clik lancettes   Pt only does these 3 times weekly     New Pharmacy:  [facility]  Phone:  [phone]    Phone Number Patient can be reached at: Home number on file [phone] (home)    Best Time: anytime    Can we leave a detailed message on this number? YES    Call taken on 3/15/2022 at 1:56 PM by Estefany Batres

## 2022-03-29 NOTE — TELEPHONE ENCOUNTER
Pt is calling in and she is needing her test strips as the wrong ones were sent in.    Accu check test strips and lancet-soft cliks.(see msg below as well)    Please send to  pharm as well-[facility] [address]    [facility] per pt

## 2022-05-02 ENCOUNTER — TELEPHONE (OUTPATIENT)
Dept: NURSING | Facility: CLINIC | Age: 87
End: 2022-05-02
Payer: MEDICARE

## 2022-05-02 NOTE — TELEPHONE ENCOUNTER
Telephone call    Starting on Friday patient had diarrhea all day after she took her metformin and then at night it was fine.  Then Saturday she took her metformin and she had diarrhea all day then at night it cleared up. So yesterday she did not take the metformin and she did not have diarrhea.  Today she has not taken the metformin and she has not had any diarrhea.  She took her blood sugar on Friday and it was 146 and she took it today and it is 152.  She wants Dr Medina to know and wants to know if there is something else she could take because she could not leave her house she was going so often.  Routing to PCP    Shantal Britton RN   [facility] Nurse Advisor  10:29 AM 5/2/2022    COVID 19 Nurse Triage Plan/Patient Instructions    Please be aware that novel coronavirus (COVID-19) may be circulating in the community. If you develop symptoms such as fever, cough, or SOB or if you have concerns about the presence of another infection including coronavirus (COVID-19), please contact your health care provider or visit [email or web address].     Disposition/Instructions    Home care recommended. Follow home care protocol based instructions.    Thank you for taking steps to prevent the spread of this virus.  o Limit your contact with others.  o Wear a simple mask to cover your cough.  o Wash your hands well and often.    Resources    [facility]: About COVID-19: [email or web address]    CDC: What to Do If You're Sick: www.cdc.gov/coronavirus/2019-ncov/about/steps-when-sick.html    CDC: Ending Home Isolation: www.cdc.gov/coronavirus/2019-ncov/hcp/disposition-in-home-patients.html     CDC: Caring for Someone: www.cdc.gov/coronavirus/2019-ncov/if-you-are-sick/care-for-someone.html     [facility]: Interim Guidance for Hospital Discharge to Home: www.health.[facility].[place]/diseases/coronavirus/hcp/hospdischarge.pdf    [facility] clinical trials (COVID-19 research studies):  clinicalaffairs.[facility]-clinical-trials     Below are the COVID-19 hotlines at the [place] Department of Health ([facility]). Interpreters are available.   o For health questions: Call 243-153-5115 or 1-416.276.6299 (7 a.m. to 7 p.m.)  o For questions about schools and childcare: Call 094-267-2472 or 1-799.871.8968 (7 a.m. to 7 p.m.)

## 2022-05-02 NOTE — TELEPHONE ENCOUNTER
Spoke with patient and relayed message below from Dr Medina. Patient verbalized understanding of instructions.    Kirk Patricio RN  [facility]

## 2022-05-02 NOTE — TELEPHONE ENCOUNTER
Stay off the metformin over the next 2 weeks.  Let me know the effect on diarrhea, and how blood sugars are running at that time.

## 2022-05-23 ENCOUNTER — TELEPHONE (OUTPATIENT)
Dept: INTERNAL MEDICINE | Facility: CLINIC | Age: 87
End: 2022-05-23

## 2022-05-23 NOTE — TELEPHONE ENCOUNTER
Reason for Call:  Other call back    Detailed comments: pt stopped her Metformin do to severe diarrhea, since stopping this she has had no issues with diarrhea, she is feeling fine, her blood sugars are funning in th 145's     Pt has an appt with PCP end of June    She does not intend to take anymore Metformin    Phone Number Patient can be reached at: Home number on file [phone] (home)    Best Time: anytime    Can we leave a detailed message on this number? YES    Call taken on 5/23/2022 at 9:44 AM by Estefany Batres

## 2022-06-28 ENCOUNTER — OFFICE VISIT (OUTPATIENT)
Dept: INTERNAL MEDICINE | Facility: CLINIC | Age: 87
End: 2022-06-28
Payer: MEDICARE

## 2022-06-28 VITALS
BODY MASS INDEX: 31.93 KG/M2 | DIASTOLIC BLOOD PRESSURE: 69 MMHG | OXYGEN SATURATION: 97 % | HEART RATE: 63 BPM | SYSTOLIC BLOOD PRESSURE: 135 MMHG | WEIGHT: 174.6 LBS

## 2022-06-28 DIAGNOSIS — I10 ESSENTIAL HYPERTENSION: ICD-10-CM

## 2022-06-28 DIAGNOSIS — E78.2 MIXED HYPERLIPIDEMIA: Primary | ICD-10-CM

## 2022-06-28 DIAGNOSIS — I87.2 VENOUS (PERIPHERAL) INSUFFICIENCY: ICD-10-CM

## 2022-06-28 DIAGNOSIS — E11.65 TYPE 2 DIABETES MELLITUS WITH HYPERGLYCEMIA, WITHOUT LONG-TERM CURRENT USE OF INSULIN (H): ICD-10-CM

## 2022-06-28 LAB
ANION GAP SERPL CALCULATED.3IONS-SCNC: 13 MMOL/L (ref 5–18)
BUN SERPL-MCNC: 18 MG/DL (ref 8–28)
CALCIUM SERPL-MCNC: 9.6 MG/DL (ref 8.5–10.5)
CHLORIDE BLD-SCNC: 102 MMOL/L (ref 98–107)
CHOLEST SERPL-MCNC: 171 MG/DL
CO2 SERPL-SCNC: 26 MMOL/L (ref 22–31)
CREAT SERPL-MCNC: 0.8 MG/DL (ref 0.6–1.1)
FASTING STATUS PATIENT QL REPORTED: NO
GFR SERPL CREATININE-BSD FRML MDRD: 70 ML/MIN/1.73M2
GLUCOSE BLD-MCNC: 122 MG/DL (ref 70–125)
HBA1C MFR BLD: 6.7 % (ref 0–5.6)
HDLC SERPL-MCNC: 36 MG/DL
LDLC SERPL CALC-MCNC: 77 MG/DL
POTASSIUM BLD-SCNC: 4.6 MMOL/L (ref 3.5–5)
SODIUM SERPL-SCNC: 141 MMOL/L (ref 136–145)
TRIGL SERPL-MCNC: 288 MG/DL

## 2022-06-28 PROCEDURE — 80061 LIPID PANEL: CPT | Performed by: INTERNAL MEDICINE

## 2022-06-28 PROCEDURE — 99214 OFFICE O/P EST MOD 30 MIN: CPT | Performed by: INTERNAL MEDICINE

## 2022-06-28 PROCEDURE — 36415 COLL VENOUS BLD VENIPUNCTURE: CPT | Performed by: INTERNAL MEDICINE

## 2022-06-28 PROCEDURE — 83036 HEMOGLOBIN GLYCOSYLATED A1C: CPT | Performed by: INTERNAL MEDICINE

## 2022-06-28 PROCEDURE — 80048 BASIC METABOLIC PNL TOTAL CA: CPT | Performed by: INTERNAL MEDICINE

## 2022-06-28 NOTE — LETTER
July 2, 2022      Tawny Santos  [address] [place] [address]        Dear Tawny:      We are writing to inform you of your test results.    Diabetic control is acceptable with diet and lifestyle with a blood sugar of 102 and a hemoglobin A1c of 6.7.  The hemoglobin A1c should be checked again in 6 months.  Your cholesterol is good with a total value of 171 and an LDL fraction of 77.  Triglycerides are only accurately checked fasting.  Continue the simvastatin.  Other labs including your potassium and kidney tests are normal.  It was nice to see you.    Resulted Orders   Hemoglobin A1c   Result Value Ref Range    Hemoglobin A1C 6.7 (H) 0.0 - 5.6 %      Comment:      Normal <5.7%   Prediabetes 5.7-6.4%    Diabetes 6.5% or higher     Note: Adopted from ADA consensus guidelines.   Basic metabolic panel  (Ca, Cl, CO2, Creat, Gluc, K, Na, BUN)   Result Value Ref Range    Sodium 141 136 - 145 mmol/L    Potassium 4.6 3.5 - 5.0 mmol/L    Chloride 102 98 - 107 mmol/L    Carbon Dioxide (CO2) 26 22 - 31 mmol/L    Anion Gap 13 5 - 18 mmol/L    Urea Nitrogen 18 8 - 28 mg/dL    Creatinine 0.80 0.60 - 1.10 mg/dL    Calcium 9.6 8.5 - 10.5 mg/dL    Glucose 122 70 - 125 mg/dL    GFR Estimate 70 >60 mL/min/1.73m2      Comment:      Effective December 21, 2021 eGFRcr in adults is calculated using the 2021 CKD-EPI creatinine equation which includes age and gender (Alla et al., NEJ, DOI: 10.1056/SHEReu5485074)   Lipid panel reflex to direct LDL Fasting   Result Value Ref Range    Cholesterol 171 <=199 mg/dL    Triglycerides 288 (H) <=149 mg/dL    Direct Measure HDL 36 (L) >=50 mg/dL      Comment:      HDL Cholesterol Reference Range:     0-2 years:   No reference ranges established for patients under 2 years old  at [facility] for lipid analytes.    2-8 years:  Greater than 45 mg/dL     18 years and older:   Female: Greater than or equal to 50 mg/dL   Male:   Greater than or equal to 40 mg/dL    LDL  Cholesterol Calculated 77 <=129 mg/dL    Patient Fasting > 8hrs? No        If you have any questions or concerns, please call the clinic at the number listed above.       Sincerely,      Benito Medina MD

## 2022-06-28 NOTE — PROGRESS NOTES
1. Type 2 diabetes mellitus with hyperglycemia, without long-term current use of insulin (H)  Tanwy is now off of metformin.  She did not tolerate it even in the low dose due to diarrhea.  Blood sugars average about in the 140 range at home on dietary management.  Monitoring labs will be checked  - Hemoglobin A1c; Future  - Basic metabolic panel  (Ca, Cl, CO2, Creat, Gluc, K, Na, BUN); Future  - Hemoglobin A1c  - Basic metabolic panel  (Ca, Cl, CO2, Creat, Gluc, K, Na, BUN)    2. Mixed hyperlipidemia  On simvastatin 40 mg daily.  Nonfasting lipids will be checked  - Lipid panel reflex to direct LDL Fasting; Future  - Lipid panel reflex to direct LDL Fasting    3. Essential hypertension  On losartan-HCTZ 50/12.5 mg daily and metoprolol XL 50 mg daily.  Blood pressure is good on this regimen    4. Venous Insufficiency  Uses compressive stockings.  Edema seems under fairly good control.    Patient Instructions   1. Consider Fourth Covid vaccine.    2.  Tetanus booster due in November    3.  I will notify you of test results    4.  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  She initially tolerated metformin fairly well, but then developed diarrhea which persisted even with lowering the dose.  Diarrhea finally resolved with stopping the medication.  He has been working hard on diet.  She reports home blood sugars now average in the 140s without medication.  Hemoglobin A1c was 6.4 when checked on metformin in January.  It has only increased to 6.7 currently with diet and lifestyle management.    She remains on losartan-HCTZ and metoprolol XL for hypertension.  She reports blood pressures at home have been good.    She otherwise feels well.  She uses compressive stockings for peripheral edema and feels this has  been under good control  HPI     Diabetes Follow-up    How often are you checking your blood sugar? A few times a week  What time of day are you checking your blood sugars (select all that apply)?  Before meals  Have you had any blood sugars above 200?  No  Have you had any blood sugars below 70?  No    What symptoms do you notice when your blood sugar is low?  None and Not applicable    What concerns do you have today about your diabetes? None     Do you have any of these symptoms? (Select all that apply)  No numbness or tingling in feet.  No redness, sores or blisters on feet.  No complaints of excessive thirst.  No reports of blurry vision.  No significant changes to weight.              Hyperlipidemia Follow-Up      Are you regularly taking any medication or supplement to lower your cholesterol?   Yes- Simvastatin    Are you having muscle aches or other side effects that you think could be caused by your cholesterol lowering medication?  No    Hypertension Follow-up      Do you check your blood pressure regularly outside of the clinic? Yes     Are you following a low salt diet? No    Are your blood pressures ever more than 140 on the top number (systolic) OR more   than 90 on the bottom number (diastolic), for example 140/90? No    BP Readings from Last 2 Encounters:   01/03/22 124/62   10/19/21 134/67     Hemoglobin A1C (%)   Date Value   01/03/2022 6.4 (H)   10/19/2021 6.4 (H)     LDL Cholesterol Calculated (mg/dL)   Date Value   01/03/2022 88   04/30/2021 77     LDL Cholesterol Direct (mg/dl)   Date Value   12/28/2020 81         Review of Systems   Review of systems is negative except as noted above      Objective    There were no vitals taken for this visit.  There is no height or weight on file to calculate BMI.  Physical Exam   /69 (BP Location: Left arm, Patient Position: Sitting, Cuff Size: Adult Regular)   Pulse 63   Wt 79.2 kg (174 lb 9.6 oz)   SpO2 97%   BMI 31.93 kg/m      General  Appearance:  Alert, cooperative, no distress, appears stated age   Head:  Normocephalic, without obvious abnormality, atraumatic   Eyes:   Aphakia noted on right.  No conjunctival hyperemia   Ears:  Normal TM's and external ear canals, both ears   Nose: Nares normal, septum midline,mucosa normal, no drainage    Throat: Lips, mucosa, and tongue normal; teeth and gums normal   Neck: Supple, symmetrical, trachea midline, no adenopathy;  thyroid: not enlarged, symmetric, no tenderness/mass/nodules; no carotid bruit or JVD   Back:   Symmetric, no curvature, ROM normal, no CVA tenderness   Lungs:   Clear to auscultation bilaterally, respirations unlabored   Breasts:   Not examined.   Heart:  Regular rate and rhythm, S1 and S2 normal, no murmur, rub, or gallop   Abdomen:   Soft, non-tender, bowel sounds active all four quadrants,  no masses, no organomegaly   Genitalia:  Not examined.   Pelvic: Deferred   Extremities:  Some varicosities lower legs.  No pitting edema.  No foot ulcers   Skin: Skin color, texture, turgor normal, no rashes or lesions   Lymph nodes: Cervical, supraclavicular, and axillary nodes normal   Neurologic: No dysarthria or aphasia.  Cranial nerves, motor or sensory exams grossly intact         Results for orders placed or performed in visit on 06/28/22 (from the past 24 hour(s))   Hemoglobin A1c   Result Value Ref Range    Hemoglobin A1C 6.7 (H) 0.0 - 5.6 %   Basic metabolic panel  (Ca, Cl, CO2, Creat, Gluc, K, Na, BUN)   Result Value Ref Range    Sodium 141 136 - 145 mmol/L    Potassium 4.6 3.5 - 5.0 mmol/L    Chloride 102 98 - 107 mmol/L    Carbon Dioxide (CO2) 26 22 - 31 mmol/L    Anion Gap 13 5 - 18 mmol/L    Urea Nitrogen 18 8 - 28 mg/dL    Creatinine 0.80 0.60 - 1.10 mg/dL    Calcium 9.6 8.5 - 10.5 mg/dL    Glucose 122 70 - 125 mg/dL    GFR Estimate 70 >60 mL/min/1.73m2   Lipid panel reflex to direct LDL Fasting   Result Value Ref Range    Cholesterol 171 <=199 mg/dL    Triglycerides 288 (H)  <=149 mg/dL    Direct Measure HDL 36 (L) >=50 mg/dL    LDL Cholesterol Calculated 77 <=129 mg/dL    Patient Fasting > 8hrs? No        Visit time 30 minutes            .  ..

## 2022-06-28 NOTE — PATIENT INSTRUCTIONS
Consider Fourth Covid vaccine.    2.  Tetanus booster due in November    3.  I will notify you of test results    4.  See in 4 to 6 months

## 2022-10-27 ENCOUNTER — TELEPHONE (OUTPATIENT)
Dept: INTERNAL MEDICINE | Facility: CLINIC | Age: 87
End: 2022-10-27

## 2022-12-04 DIAGNOSIS — E78.2 MIXED HYPERLIPIDEMIA: ICD-10-CM

## 2022-12-04 DIAGNOSIS — I10 ESSENTIAL HYPERTENSION, BENIGN: ICD-10-CM

## 2022-12-05 RX ORDER — METOPROLOL SUCCINATE 50 MG/1
TABLET, EXTENDED RELEASE ORAL
Qty: 90 TABLET | Refills: 1 | Status: SHIPPED | OUTPATIENT
Start: 2022-12-05

## 2022-12-05 RX ORDER — LOSARTAN POTASSIUM AND HYDROCHLOROTHIAZIDE 12.5; 5 MG/1; MG/1
TABLET ORAL
Qty: 90 TABLET | Refills: 1 | Status: SHIPPED | OUTPATIENT
Start: 2022-12-05

## 2022-12-05 RX ORDER — SIMVASTATIN 40 MG
TABLET ORAL
Qty: 90 TABLET | Refills: 1 | Status: SHIPPED | OUTPATIENT
Start: 2022-12-05

## 2022-12-06 NOTE — TELEPHONE ENCOUNTER
"Last Written Prescription Date:  2/5/22  Last Fill Quantity: 90,  # refills: 3   Last office visit provider:  6/28/22     Requested Prescriptions   Pending Prescriptions Disp Refills     losartan-hydrochlorothiazide (HYZAAR) 50-12.5 MG tablet [Pharmacy Med Name: LOSARTAN/HCTZ TABS 50/12.5MG] 90 tablet 3     Sig: TAKE 1 TABLET DAILY       Angiotensin-II Receptors Passed - 12/4/2022 11:52 PM        Passed - Last blood pressure under 140/90 in past 12 months     BP Readings from Last 3 Encounters:   06/28/22 135/69   01/03/22 124/62   10/19/21 134/67                 Passed - Recent (12 mo) or future (30 days) visit within the authorizing provider's specialty     Patient has had an office visit with the authorizing provider or a provider within the authorizing providers department within the previous 12 mos or has a future within next 30 days. See \"Patient Info\" tab in inbasket, or \"Choose Columns\" in Meds & Orders section of the refill encounter.              Passed - Medication is active on med list        Passed - Patient is age 18 or older        Passed - No active pregnancy on record        Passed - Normal serum creatinine on file in past 12 months     Recent Labs   Lab Test 06/28/22  1110   CR 0.80       Ok to refill medication if creatinine is low          Passed - Normal serum potassium on file in past 12 months     Recent Labs   Lab Test 06/28/22  1110   POTASSIUM 4.6                    Passed - No positive pregnancy test in past 12 months       Diuretics (Including Combos) Protocol Passed - 12/4/2022 11:52 PM        Passed - Blood pressure under 140/90 in past 12 months     BP Readings from Last 3 Encounters:   06/28/22 135/69   01/03/22 124/62   10/19/21 134/67                 Passed - Recent (12 mo) or future (30 days) visit within the authorizing provider's specialty     Patient has had an office visit with the authorizing provider or a provider within the authorizing providers department within the " "previous 12 mos or has a future within next 30 days. See \"Patient Info\" tab in inbasket, or \"Choose Columns\" in Meds & Orders section of the refill encounter.              Passed - Medication is active on med list        Passed - Patient is age 18 or older        Passed - No active pregancy on record        Passed - Normal serum creatinine on file in past 12 months     Recent Labs   Lab Test 06/28/22  1110   CR 0.80              Passed - Normal serum potassium on file in past 12 months     Recent Labs   Lab Test 06/28/22  1110   POTASSIUM 4.6                    Passed - Normal serum sodium on file in past 12 months     Recent Labs   Lab Test 06/28/22  1110                 Passed - No positive pregnancy test in past 12 months           simvastatin (ZOCOR) 40 MG tablet [Pharmacy Med Name: SIMVASTATIN TABS 40MG] 90 tablet 3     Sig: TAKE 1 TABLET AT BEDTIME       Statins Protocol Passed - 12/4/2022 11:52 PM        Passed - LDL on file in past 12 months     Recent Labs   Lab Test 06/28/22  1110   LDL 77             Passed - No abnormal creatine kinase in past 12 months     No lab results found.             Passed - Recent (12 mo) or future (30 days) visit within the authorizing provider's specialty     Patient has had an office visit with the authorizing provider or a provider within the authorizing providers department within the previous 12 mos or has a future within next 30 days. See \"Patient Info\" tab in inbasket, or \"Choose Columns\" in Meds & Orders section of the refill encounter.              Passed - Medication is active on med list        Passed - Patient is age 18 or older        Passed - No active pregnancy on record        Passed - No positive pregnancy test in past 12 months           metoprolol succinate ER (TOPROL XL) 50 MG 24 hr tablet [Pharmacy Med Name: METOPROLOL SUCCINATE ER TABS 50MG] 90 tablet 3     Sig: TAKE 1 TABLET DAILY       Beta-Blockers Protocol Passed - 12/4/2022 11:52 PM        " "Passed - Blood pressure under 140/90 in past 12 months     BP Readings from Last 3 Encounters:   06/28/22 135/69   01/03/22 124/62   10/19/21 134/67                 Passed - Patient is age 6 or older        Passed - Recent (12 mo) or future (30 days) visit within the authorizing provider's specialty     Patient has had an office visit with the authorizing provider or a provider within the authorizing providers department within the previous 12 mos or has a future within next 30 days. See \"Patient Info\" tab in inbasket, or \"Choose Columns\" in Meds & Orders section of the refill encounter.              Passed - Medication is active on med list             Amy Tsang RN 12/05/22 6:10 PM  "

## 2023-01-01 DIAGNOSIS — K21.9 ESOPHAGEAL REFLUX: ICD-10-CM

## 2023-01-02 NOTE — TELEPHONE ENCOUNTER
"Last Written Prescription Date:  1/6/22  Last Fill Quantity: 45,  # refills: 3   Last office visit provider:  6/28/22     Requested Prescriptions   Pending Prescriptions Disp Refills     omeprazole (PRILOSEC) 20 MG DR capsule [Pharmacy Med Name: OMEPRAZOLE DR CAPS 20MG] 45 capsule 3     Sig: TAKE 1 CAPSULE EVERY OTHER DAY       PPI Protocol Passed - 1/2/2023 11:51 AM        Passed - Not on Clopidogrel (unless Pantoprazole ordered)        Passed - No diagnosis of osteoporosis on record        Passed - Recent (12 mo) or future (30 days) visit within the authorizing provider's specialty     Patient has had an office visit with the authorizing provider or a provider within the authorizing providers department within the previous 12 mos or has a future within next 30 days. See \"Patient Info\" tab in inbasket, or \"Choose Columns\" in Meds & Orders section of the refill encounter.              Passed - Medication is active on med list        Passed - Patient is age 18 or older        Passed - No active pregnacy on record        Passed - No positive pregnancy test in past 12 months             Brent Rae RN 01/02/23 11:52 AM  "

## 2023-01-09 ENCOUNTER — OFFICE VISIT (OUTPATIENT)
Dept: INTERNAL MEDICINE | Facility: CLINIC | Age: 88
End: 2023-01-09
Payer: MEDICARE

## 2023-01-09 VITALS
HEART RATE: 71 BPM | SYSTOLIC BLOOD PRESSURE: 128 MMHG | TEMPERATURE: 97.7 F | BODY MASS INDEX: 32.92 KG/M2 | RESPIRATION RATE: 16 BRPM | OXYGEN SATURATION: 98 % | WEIGHT: 178.9 LBS | HEIGHT: 62 IN | DIASTOLIC BLOOD PRESSURE: 60 MMHG

## 2023-01-09 DIAGNOSIS — I10 ESSENTIAL HYPERTENSION: ICD-10-CM

## 2023-01-09 DIAGNOSIS — E66.01 MORBID OBESITY (H): ICD-10-CM

## 2023-01-09 DIAGNOSIS — I87.2 VENOUS (PERIPHERAL) INSUFFICIENCY: ICD-10-CM

## 2023-01-09 DIAGNOSIS — Z23 NEED FOR TETANUS BOOSTER: ICD-10-CM

## 2023-01-09 DIAGNOSIS — E11.65 TYPE 2 DIABETES MELLITUS WITH HYPERGLYCEMIA, WITHOUT LONG-TERM CURRENT USE OF INSULIN (H): ICD-10-CM

## 2023-01-09 DIAGNOSIS — C43.9 MALIGNANT MELANOMA, UNSPECIFIED SITE (H): ICD-10-CM

## 2023-01-09 DIAGNOSIS — Z00.00 MEDICARE ANNUAL WELLNESS VISIT, SUBSEQUENT: Primary | ICD-10-CM

## 2023-01-09 DIAGNOSIS — K21.9 GASTROESOPHAGEAL REFLUX DISEASE, UNSPECIFIED WHETHER ESOPHAGITIS PRESENT: ICD-10-CM

## 2023-01-09 DIAGNOSIS — Z12.31 VISIT FOR SCREENING MAMMOGRAM: ICD-10-CM

## 2023-01-09 LAB
ALBUMIN SERPL BCG-MCNC: 4.3 G/DL (ref 3.5–5.2)
ALBUMIN UR-MCNC: NEGATIVE MG/DL
ALP SERPL-CCNC: 74 U/L (ref 35–104)
ALT SERPL W P-5'-P-CCNC: 17 U/L (ref 10–35)
ANION GAP SERPL CALCULATED.3IONS-SCNC: 15 MMOL/L (ref 7–15)
APPEARANCE UR: CLEAR
AST SERPL W P-5'-P-CCNC: 22 U/L (ref 10–35)
BACTERIA #/AREA URNS HPF: ABNORMAL /HPF
BILIRUB SERPL-MCNC: 0.5 MG/DL
BILIRUB UR QL STRIP: NEGATIVE
BUN SERPL-MCNC: 17.2 MG/DL (ref 8–23)
CALCIUM SERPL-MCNC: 9.5 MG/DL (ref 8.8–10.2)
CHLORIDE SERPL-SCNC: 104 MMOL/L (ref 98–107)
CHOLEST SERPL-MCNC: 174 MG/DL
COLOR UR AUTO: YELLOW
CREAT SERPL-MCNC: 0.7 MG/DL (ref 0.51–0.95)
CREAT UR-MCNC: 77.2 MG/DL
DEPRECATED HCO3 PLAS-SCNC: 24 MMOL/L (ref 22–29)
ERYTHROCYTE [DISTWIDTH] IN BLOOD BY AUTOMATED COUNT: 12.9 % (ref 10–15)
GFR SERPL CREATININE-BSD FRML MDRD: 83 ML/MIN/1.73M2
GLUCOSE SERPL-MCNC: 138 MG/DL (ref 70–99)
GLUCOSE UR STRIP-MCNC: NEGATIVE MG/DL
HBA1C MFR BLD: 6.8 % (ref 0–5.6)
HCT VFR BLD AUTO: 40.5 % (ref 35–47)
HDLC SERPL-MCNC: 39 MG/DL
HGB BLD-MCNC: 13.3 G/DL (ref 11.7–15.7)
HGB UR QL STRIP: ABNORMAL
KETONES UR STRIP-MCNC: NEGATIVE MG/DL
LDLC SERPL CALC-MCNC: 85 MG/DL
LEUKOCYTE ESTERASE UR QL STRIP: NEGATIVE
MCH RBC QN AUTO: 30.4 PG (ref 26.5–33)
MCHC RBC AUTO-ENTMCNC: 32.8 G/DL (ref 31.5–36.5)
MCV RBC AUTO: 93 FL (ref 78–100)
MICROALBUMIN UR-MCNC: <12 MG/L
MICROALBUMIN/CREAT UR: NORMAL MG/G{CREAT}
NITRATE UR QL: NEGATIVE
NONHDLC SERPL-MCNC: 135 MG/DL
PH UR STRIP: 5.5 [PH] (ref 5–8)
PLATELET # BLD AUTO: 203 10E3/UL (ref 150–450)
POTASSIUM SERPL-SCNC: 4.1 MMOL/L (ref 3.4–5.3)
PROT SERPL-MCNC: 7.3 G/DL (ref 6.4–8.3)
RBC # BLD AUTO: 4.37 10E6/UL (ref 3.8–5.2)
RBC #/AREA URNS AUTO: ABNORMAL /HPF
SODIUM SERPL-SCNC: 143 MMOL/L (ref 136–145)
SP GR UR STRIP: 1.02 (ref 1–1.03)
SQUAMOUS #/AREA URNS AUTO: ABNORMAL /LPF
TRIGL SERPL-MCNC: 251 MG/DL
UROBILINOGEN UR STRIP-ACNC: 0.2 E.U./DL
WBC # BLD AUTO: 8.4 10E3/UL (ref 4–11)
WBC #/AREA URNS AUTO: ABNORMAL /HPF

## 2023-01-09 PROCEDURE — 85027 COMPLETE CBC AUTOMATED: CPT | Performed by: INTERNAL MEDICINE

## 2023-01-09 PROCEDURE — 82570 ASSAY OF URINE CREATININE: CPT | Performed by: INTERNAL MEDICINE

## 2023-01-09 PROCEDURE — 80061 LIPID PANEL: CPT | Performed by: INTERNAL MEDICINE

## 2023-01-09 PROCEDURE — 83036 HEMOGLOBIN GLYCOSYLATED A1C: CPT | Performed by: INTERNAL MEDICINE

## 2023-01-09 PROCEDURE — G0439 PPPS, SUBSEQ VISIT: HCPCS | Performed by: INTERNAL MEDICINE

## 2023-01-09 PROCEDURE — 36415 COLL VENOUS BLD VENIPUNCTURE: CPT | Performed by: INTERNAL MEDICINE

## 2023-01-09 PROCEDURE — 81001 URINALYSIS AUTO W/SCOPE: CPT | Performed by: INTERNAL MEDICINE

## 2023-01-09 PROCEDURE — 80053 COMPREHEN METABOLIC PANEL: CPT | Performed by: INTERNAL MEDICINE

## 2023-01-09 PROCEDURE — 82043 UR ALBUMIN QUANTITATIVE: CPT | Performed by: INTERNAL MEDICINE

## 2023-01-09 ASSESSMENT — ENCOUNTER SYMPTOMS
FREQUENCY: 0
ARTHRALGIAS: 1
DIZZINESS: 0
HEMATOCHEZIA: 0
HEADACHES: 0
JOINT SWELLING: 1
CHILLS: 0
FEVER: 0
EYE PAIN: 0
NAUSEA: 0
MYALGIAS: 0
DIARRHEA: 0
ABDOMINAL PAIN: 0
BREAST MASS: 0
DYSURIA: 0
SHORTNESS OF BREATH: 0
HEARTBURN: 0
HEMATURIA: 0
CONSTIPATION: 0
WEAKNESS: 1
COUGH: 0
PALPITATIONS: 0
SORE THROAT: 0
PARESTHESIAS: 0
NERVOUS/ANXIOUS: 0

## 2023-01-09 ASSESSMENT — ACTIVITIES OF DAILY LIVING (ADL): CURRENT_FUNCTION: NO ASSISTANCE NEEDED

## 2023-01-09 NOTE — LETTER
January 11, 2023      Tawny Santos  [address] [place] [address]        Dear Tawny:    We are writing to inform you of your test results.    Diabetic control remains fairly good with blood sugar of 138 and a hemoglobin A1c of 6.8.  This compares to a hemoglobin A1c of 6.7 on last check.  Continue current management.  Lipids are notable for total cholesterol 164 and an LDL fraction of 85.  Continue the simvastatin.  Triglycerides would decrease with weight loss.  Other labs including your potassium, hemoglobin, liver enzymes, and tests of kidney function are normal.  It was nice to see you.    Resulted Orders   UA Macro with Reflex to Micro and Culture - lab collect   Result Value Ref Range    Color Urine Yellow Colorless, Straw, Light Yellow, Yellow    Appearance Urine Clear Clear    Glucose Urine Negative Negative mg/dL    Bilirubin Urine Negative Negative    Ketones Urine Negative Negative mg/dL    Specific Gravity Urine 1.025 1.005 - 1.030    Blood Urine Trace (A) Negative    pH Urine 5.5 5.0 - 8.0    Protein Albumin Urine Negative Negative mg/dL    Urobilinogen Urine 0.2 0.2, 1.0 E.U./dL    Nitrite Urine Negative Negative    Leukocyte Esterase Urine Negative Negative   Comprehensive metabolic panel   Result Value Ref Range    Sodium 143 136 - 145 mmol/L    Potassium 4.1 3.4 - 5.3 mmol/L    Chloride 104 98 - 107 mmol/L    Carbon Dioxide (CO2) 24 22 - 29 mmol/L    Anion Gap 15 7 - 15 mmol/L    Urea Nitrogen 17.2 8.0 - 23.0 mg/dL    Creatinine 0.70 0.51 - 0.95 mg/dL    Calcium 9.5 8.8 - 10.2 mg/dL    Glucose 138 (H) 70 - 99 mg/dL    Alkaline Phosphatase 74 35 - 104 U/L    AST 22 10 - 35 U/L    ALT 17 10 - 35 U/L    Protein Total 7.3 6.4 - 8.3 g/dL    Albumin 4.3 3.5 - 5.2 g/dL    Bilirubin Total 0.5 <=1.2 mg/dL    GFR Estimate 83 >60 mL/min/1.73m2      Comment:      Effective December 21, 2021 eGFRcr in adults is calculated using the 2021 CKD-EPI creatinine equation which includes age and gender  (Alla salinas al., [facility], DOI: 10.1056/WVQKip2942436)   CBC with platelets   Result Value Ref Range    WBC Count 8.4 4.0 - 11.0 10e3/uL    RBC Count 4.37 3.80 - 5.20 10e6/uL    Hemoglobin 13.3 11.7 - 15.7 g/dL    Hematocrit 40.5 35.0 - 47.0 %    MCV 93 78 - 100 fL    MCH 30.4 26.5 - 33.0 pg    MCHC 32.8 31.5 - 36.5 g/dL    RDW 12.9 10.0 - 15.0 %    Platelet Count 203 150 - 450 10e3/uL   Lipid panel reflex to direct LDL Fasting   Result Value Ref Range    Cholesterol 174 <200 mg/dL    Triglycerides 251 (H) <150 mg/dL    Direct Measure HDL 39 (L) >=50 mg/dL    LDL Cholesterol Calculated 85 <=100 mg/dL    Non HDL Cholesterol 135 (H) <130 mg/dL    Narrative    Cholesterol  Desirable:  <200 mg/dL    Triglycerides  Normal:  Less than 150 mg/dL  Borderline High:  150-199 mg/dL  High:  200-499 mg/dL  Very High:  Greater than or equal to 500 mg/dL    Direct Measure HDL  Female:  Greater than or equal to 50 mg/dL   Male:  Greater than or equal to 40 mg/dL    LDL Cholesterol  Desirable:  <100mg/dL  Above Desirable:  100-129 mg/dL   Borderline High:  130-159 mg/dL   High:  160-189 mg/dL   Very High:  >= 190 mg/dL    Non HDL Cholesterol  Desirable:  130 mg/dL  Above Desirable:  130-159 mg/dL  Borderline High:  160-189 mg/dL  High:  190-219 mg/dL  Very High:  Greater than or equal to 220 mg/dL   Albumin Random Urine Quantitative with Creat Ratio   Result Value Ref Range    Albumin Urine mg/L <12.0 mg/L      Comment:      The reference ranges have not been established in urine albumin. The results should be integrated into the clinical context for interpretation.    Albumin Urine mg/g Cr        Comment:      Unable to calculate, urine albumin and/or urine creatinine is outside detectable limits.  Microalbuminuria is defined as an albumin:creatinine ratio of 17 to 299 for males and 25 to 299 for females. A ratio of albumin:creatinine of 300 or higher is indicative of overt proteinuria.  Due to biologic variability, positive results  should be confirmed by a second, first-morning random or 24-hour timed urine specimen. If there is discrepancy, a third specimen is recommended. When 2 out of 3 results are in the microalbuminuria range, this is evidence for incipient nephropathy and warrants increased efforts at glucose control, blood pressure control, and institution of therapy with an angiotensin-converting-enzyme (ACE) inhibitor (if the patient can tolerate it).      Creatinine Urine mg/dL 77.2 mg/dL      Comment:      The reference ranges have not been established in urine creatinine. The results should be integrated into the clinical context for interpretation.   Hemoglobin A1c   Result Value Ref Range    Hemoglobin A1C 6.8 (H) 0.0 - 5.6 %      Comment:      Normal <5.7%   Prediabetes 5.7-6.4%    Diabetes 6.5% or higher     Note: Adopted from ADA consensus guidelines.   Urine Microscopic   Result Value Ref Range    Bacteria Urine Many (A) None Seen /HPF    RBC Urine 0-2 0-2 /HPF /HPF    WBC Urine 0-5 0-5 /HPF /HPF    Squamous Epithelials Urine Few (A) None Seen /LPF    Narrative    Urine Culture not indicated       If you have any questions or concerns, please call the clinic at the number listed above.       Sincerely,      Benito Medina MD

## 2023-01-09 NOTE — PROGRESS NOTES
"Answers for HPI/ROS submitted by the patient on 1/9/2023  In general, how would you rate your overall physical health?: good  Frequency of exercise:: 4-5 days/week  Do you usually eat at least 4 servings of fruit and vegetables a day, include whole grains & fiber, and avoid regularly eating high fat or \"junk\" foods? : No  Taking medications regularly:: Yes  Medication side effects:: None  Activities of Daily Living: no assistance needed  Home safety: no safety concerns identified  Hearing Impairment:: difficulty following a conversation in a noisy restaurant or crowded room  In the past 6 months, have you been bothered by leaking of urine?: Yes  abdominal pain: No  Blood in stool: No  Blood in urine: No  chest pain: No  chills: No  congestion: No  constipation: No  cough: No  diarrhea: No  dizziness: No  ear pain: No  eye pain: No  nervous/anxious: No  fever: No  frequency: No  genital sores: No  headaches: No  hearing loss: No  heartburn: No  arthralgias: Yes  joint swelling: Yes  peripheral edema: Yes  mood changes: No  myalgias: No  nausea: No  dysuria: No  palpitations: No  Skin sensation changes: No  sore throat: No  urgency: No  rash: No  shortness of breath: No  visual disturbance: No  weakness: Yes  pelvic pain: No  vaginal bleeding: No  vaginal discharge: No  tenderness: No  breast mass: No  breast discharge: No  In general, how would you rate your overall mental or emotional health?: good  Additional concerns today:: No  Duration of exercise:: Less than 15 minutes      "

## 2023-01-09 NOTE — PROGRESS NOTES
"SUBJECTIVE:   Tawny is a 88 year old who presents for Preventive Visit.  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  She is  and lives independently.  No cognitive deficits are noted.  Her health risk assessment was reviewed.  She is fairly sedentary, but otherwise has good health maintenance habits.    She has a history of type 2 diabetes managed with diet and lifestyle.  She previously did not tolerate metformin due to diarrhea.  Hemoglobin A1c was 6.7 in June.  She reports blood sugars have averaged about 150.    BMI is above goal at 32.72.  However, this is lower than in the past.    She has a history of esophageal reflux.  Symptoms are under good control with omeprazole.  She will note recurrence if she stops it.    She remains on losartan-HCTZ and metoprolol XL for hypertension.  Blood pressure is good on this regimen.    She is on simvastatin 40 mg daily for hyperlipidemia.    She has a past history of a melanoma.  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 Wilson.  The wound dehisced, but is slowly healing.          Patient has been advised of split billing requirements and indicates understanding: No  Are you in the first 12 months of your Medicare coverage?  No    Healthy Habits:     In general, how would you rate your overall health?  Good    Frequency of exercise:  4-5 days/week    Duration of exercise:  Less than 15 minutes    Do you usually eat at least 4 servings of fruit and vegetables a day, include whole grains    & fiber and avoid regularly eating high fat or \"junk\" foods?  No    Taking medications regularly:  Yes    Medication side effects:  None    Ability to successfully perform activities of daily living:  No assistance needed    Home Safety:  No safety concerns identified    Hearing Impairment:  Difficulty following a conversation in a noisy restaurant or crowded room    In the past 6 months, have you been bothered by leaking of urine? Yes    In general, how would you " rate your overall mental or emotional health?  Good      PHQ-2 Total Score: 0    Additional concerns today:  No      Have you ever done Advance Care Planning? (For example, a Health Directive, POLST, or a discussion with a medical provider or your loved ones about your wishes): Yes, advance care planning is on file.       Fall risk  Fallen 2 or more times in the past year?: No  Any fall with injury in the past year?: No    Cognitive Screening   1) Repeat 3 items (Leader, Season, Table)    2) Clock draw: NORMAL  3) 3 item recall: Recalls 3 objects  Results: 3 items recalled: COGNITIVE IMPAIRMENT LESS LIKELY    Mini-CogTM Copyright S Stevie. Licensed by the author for use in [facility]; reprinted with permission (princess@[facility]). All rights reserved.      Do you have sleep apnea, excessive snoring or daytime drowsiness?: no    Reviewed and updated as needed this visit by clinical staff     Meds              Reviewed and updated as needed this visit by Provider                 Social History     Tobacco Use     Smoking status: Former     Smokeless tobacco: Never     Tobacco comments:     Quit smoking around 1960   Substance Use Topics     Alcohol use: No     Comment: Alcoholic Drinks/day: pt stopped smoking around 1960          Alcohol Use 1/9/2023   Prescreen: >3 drinks/day or >7 drinks/week? No               Current providers sharing in care for this patient include:   Patient Care Team:  Benito Medina MD as PCP - General (Internal Medicine)  Benito Medina MD as Assigned PCP  Avelino Wilson MD dermatology  Jacinto Reddy MD ophthalmology  The following health maintenance items are reviewed in Epic and correct as of today:  Health Maintenance   Topic Date Due     A1C  09/28/2022     DTAP/TDAP/TD IMMUNIZATION (2 - Td or Tdap) 11/01/2022     MICROALBUMIN  01/03/2023     DIABETIC FOOT EXAM  01/03/2023     ANNUAL REVIEW OF HM ORDERS  01/03/2023     MEDICARE ANNUAL WELLNESS VISIT  01/03/2023     EYE  "EXAM  01/31/2023     BMP  06/28/2023     LIPID  06/28/2023     FALL RISK ASSESSMENT  01/09/2024     ADVANCE CARE PLANNING  01/03/2027     PHQ-2 (once per calendar year)  Completed     INFLUENZA VACCINE  Completed     Pneumococcal Vaccine: 65+ Years  Completed     ZOSTER IMMUNIZATION  Completed     COVID-19 Vaccine  Completed     IPV IMMUNIZATION  Aged Out     MENINGITIS IMMUNIZATION  Aged Out           Pertinent mammograms are reviewed under the imaging tab.    Review of Systems   Constitutional: Negative for chills and fever.   HENT: Negative for congestion, ear pain, hearing loss and sore throat.    Eyes: Negative for pain and visual disturbance.   Respiratory: Negative for cough and shortness of breath.    Cardiovascular: Positive for peripheral edema. Negative for chest pain and palpitations.   Gastrointestinal: Negative for abdominal pain, constipation, diarrhea, heartburn, hematochezia and nausea.   Breasts:  Negative for tenderness, breast mass and discharge.   Genitourinary: Negative for dysuria, frequency, genital sores, hematuria, pelvic pain, urgency, vaginal bleeding and vaginal discharge.   Musculoskeletal: Positive for arthralgias and joint swelling. Negative for myalgias.   Skin: Negative for rash.   Neurological: Positive for weakness. Negative for dizziness, headaches and paresthesias.   Psychiatric/Behavioral: Negative for mood changes. The patient is not nervous/anxious.      {    OBJECTIVE:   /60 (BP Location: Left arm, Patient Position: Sitting, Cuff Size: Adult Regular)   Pulse 71   Temp 97.7  F (36.5  C) (Tympanic)   Resp 16   Ht 1.575 m (5' 2\")   Wt 81.1 kg (178 lb 14.4 oz)   SpO2 98%   BMI 32.72 kg/m   Estimated body mass index is 32.72 kg/m  as calculated from the following:    Height as of this encounter: 1.575 m (5' 2\").    Weight as of this encounter: 81.1 kg (178 lb 14.4 oz).  Physical Exam  /60 (BP Location: Left arm, Patient Position: Sitting, Cuff Size: Adult " "Regular)   Pulse 71   Temp 97.7  F (36.5  C) (Tympanic)   Resp 16   Ht 1.575 m (5' 2\")   Wt 81.1 kg (178 lb 14.4 oz)   SpO2 98%   BMI 32.72 kg/m      General Appearance:  Alert, cooperative, no distress, appears stated age   Head:  Normocephalic, without obvious abnormality, atraumatic   Eyes:   Aphakia right eye.  EOMs full.  No conjunctival hyperemia   Ears:  Normal TM's and external ear canals, both ears   Nose: Nares normal, septum midline,mucosa normal, no drainage    Throat: Lips, mucosa, and tongue normal; teeth and gums normal   Neck: Supple, symmetrical, trachea midline, no adenopathy;  thyroid: not enlarged, symmetric, no tenderness/mass/nodules; no carotid bruit or JVD   Back:   Symmetric, no curvature, ROM normal, no CVA tenderness   Lungs:   Clear to auscultation bilaterally, respirations unlabored   Breasts:  No breast masses, tenderness, asymmetry, or nipple discharge.   Heart:  Regular rate and rhythm, S1 and S2 normal, no murmur, rub, or gallop   Abdomen:   Soft, non-tender, bowel sounds active all four quadrants,  no masses, no organomegaly.  Moderately obese.  Lower abdominal midline scar   Genitalia:  Exam deferred   Pelvic: Deferred   Extremities:  Trace ankle edema.  Pulses intact.  No foot ulcers   Skin: Skin color, texture, turgor normal, no rashes or lesions suspicious for malignancy.  Open area right upper back related to previous skin cancer excision.  No purulent drainage noted or surrounding erythema.   Lymph nodes: Cervical, supraclavicular, and axillary nodes normal   Neurologic: No dysarthria or aphasia.  Cranial nerves, motor or sensory exams intact with symmetric DTRs         Diagnostic Test Results:  Labs reviewed in Epic  Results for orders placed or performed in visit on 01/09/23   UA Macro with Reflex to Micro and Culture - lab collect     Status: Abnormal    Specimen: Urine, Clean Catch   Result Value Ref Range    Color Urine Yellow Colorless, Straw, Light Yellow, Yellow "    Appearance Urine Clear Clear    Glucose Urine Negative Negative mg/dL    Bilirubin Urine Negative Negative    Ketones Urine Negative Negative mg/dL    Specific Gravity Urine 1.025 1.005 - 1.030    Blood Urine Trace (A) Negative    pH Urine 5.5 5.0 - 8.0    Protein Albumin Urine Negative Negative mg/dL    Urobilinogen Urine 0.2 0.2, 1.0 E.U./dL    Nitrite Urine Negative Negative    Leukocyte Esterase Urine Negative Negative   Comprehensive metabolic panel     Status: Abnormal   Result Value Ref Range    Sodium 143 136 - 145 mmol/L    Potassium 4.1 3.4 - 5.3 mmol/L    Chloride 104 98 - 107 mmol/L    Carbon Dioxide (CO2) 24 22 - 29 mmol/L    Anion Gap 15 7 - 15 mmol/L    Urea Nitrogen 17.2 8.0 - 23.0 mg/dL    Creatinine 0.70 0.51 - 0.95 mg/dL    Calcium 9.5 8.8 - 10.2 mg/dL    Glucose 138 (H) 70 - 99 mg/dL    Alkaline Phosphatase 74 35 - 104 U/L    AST 22 10 - 35 U/L    ALT 17 10 - 35 U/L    Protein Total 7.3 6.4 - 8.3 g/dL    Albumin 4.3 3.5 - 5.2 g/dL    Bilirubin Total 0.5 <=1.2 mg/dL    GFR Estimate 83 >60 mL/min/1.73m2   CBC with platelets     Status: Normal   Result Value Ref Range    WBC Count 8.4 4.0 - 11.0 10e3/uL    RBC Count 4.37 3.80 - 5.20 10e6/uL    Hemoglobin 13.3 11.7 - 15.7 g/dL    Hematocrit 40.5 35.0 - 47.0 %    MCV 93 78 - 100 fL    MCH 30.4 26.5 - 33.0 pg    MCHC 32.8 31.5 - 36.5 g/dL    RDW 12.9 10.0 - 15.0 %    Platelet Count 203 150 - 450 10e3/uL   Lipid panel reflex to direct LDL Fasting     Status: Abnormal   Result Value Ref Range    Cholesterol 174 <200 mg/dL    Triglycerides 251 (H) <150 mg/dL    Direct Measure HDL 39 (L) >=50 mg/dL    LDL Cholesterol Calculated 85 <=100 mg/dL    Non HDL Cholesterol 135 (H) <130 mg/dL    Narrative    Cholesterol  Desirable:  <200 mg/dL    Triglycerides  Normal:  Less than 150 mg/dL  Borderline High:  150-199 mg/dL  High:  200-499 mg/dL  Very High:  Greater than or equal to 500 mg/dL    Direct Measure HDL  Female:  Greater than or equal to 50 mg/dL    Male:  Greater than or equal to 40 mg/dL    LDL Cholesterol  Desirable:  <100mg/dL  Above Desirable:  100-129 mg/dL   Borderline High:  130-159 mg/dL   High:  160-189 mg/dL   Very High:  >= 190 mg/dL    Non HDL Cholesterol  Desirable:  130 mg/dL  Above Desirable:  130-159 mg/dL  Borderline High:  160-189 mg/dL  High:  190-219 mg/dL  Very High:  Greater than or equal to 220 mg/dL   Albumin Random Urine Quantitative with Creat Ratio     Status: None   Result Value Ref Range    Albumin Urine mg/L <12.0 mg/L    Albumin Urine mg/g Cr      Creatinine Urine mg/dL 77.2 mg/dL   Hemoglobin A1c     Status: Abnormal   Result Value Ref Range    Hemoglobin A1C 6.8 (H) 0.0 - 5.6 %   Urine Microscopic     Status: Abnormal   Result Value Ref Range    Bacteria Urine Many (A) None Seen /HPF    RBC Urine 0-2 0-2 /HPF /HPF    WBC Urine 0-5 0-5 /HPF /HPF    Squamous Epithelials Urine Few (A) None Seen /LPF    Narrative    Urine Culture not indicated       ASSESSMENT / PLAN:   (Z00.00) Medicare annual wellness visit, subsequent  (primary encounter diagnosis)  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  She is .  No cognitive deficits are noted.  She lives independently in her own home.  She is fairly sedentary, but health maintenance habits are good.  Plan: REVIEW OF HEALTH MAINTENANCE PROTOCOL ORDERS        Appropriate vaccines and health maintenance measures were discussed    (E11.65) Type 2 diabetes mellitus with hyperglycemia, without long-term current use of insulin (H)  Comment: She previously did not tolerate metformin due to diarrhea.  She is now managed with diet and lifestyle.  Monitoring labs including hemoglobin A1c will be checked.  Adding a medication would be advised if hemoglobin A1c is above 7.  Plan: Comprehensive metabolic panel, Lipid panel         reflex to direct LDL Fasting, Albumin Random         Urine Quantitative with Creat Ratio, Hemoglobin        A1c            (E66.01) Obesity (BMI 35.0-39.9) with comorbidity  "(H)  Comment: BMI has decreased to 32.72 with diet and lifestyle management  Plan: Continue current efforts    (K21.9) Gastroesophageal reflux disease, unspecified whether esophagitis present  Comment: Doing well with omeprazole.  She will develop recurrent symptoms if she discontinues it.      (I10) Essential hypertension  Comment: Good control with the losartan HCTZ and metoprolol  Plan: UA Macro with Reflex to Micro and Culture - lab        collect, Comprehensive metabolic panel, Urine         Microscopic            (I87.2) Venous Insufficiency  Comment: Symptoms seem under good control.  Conservative management was reviewed      (C43.9) Malignant melanoma, unspecified site (H)  Comment: No evidence for recurrence.  She has regular dermatology follow-up with Dr. Wilson, and just had a skin cancer excised off the back  Plan: Comprehensive metabolic panel, CBC with         platelets            (Z12.31) Visit for screening mammogram  Comment: Periodic screening mammogram is advised  Plan: *MA Screening Digital Bilateral            (Z23) Need for tetanus booster  Comment: Td booster is advised. Cheaper at pharmacy  Plan: TDAP VACCINE (Adacel, Boostrix)          Patient Instructions   1. Due for tetanus booster.  Cheaper at pharmacy.    2.  Consider screening mammogram.    3.  I will notify you of test results    4.  Follow-up in 4 to 6 months at [facility].  [facility] [place]    COUNSELING:  Reviewed preventive health counseling, as reflected in patient instructions       Regular exercise       Healthy diet/nutrition       Bladder control       Osteoporosis prevention/bone health       Appropriate vaccinations were reviewed      BMI:   Estimated body mass index is 32.72 kg/m  as calculated from the following:    Height as of this encounter: 1.575 m (5' 2\").    Weight as of this encounter: 81.1 kg (178 lb 14.4 oz).   Weight management plan: Discussed healthy diet and " exercise guidelines      She reports that she has quit smoking. She has never used smokeless tobacco.      Appropriate preventive services were discussed with this patient, including applicable screening as appropriate for cardiovascular disease, diabetes, osteopenia/osteoporosis, and glaucoma.  As appropriate for age/gender, discussed screening for colorectal cancer, prostate cancer, breast cancer, and cervical cancer. Checklist reviewing preventive services available has been given to the patient.    Reviewed patients plan of care and provided an AVS. The Basic Care Plan (routine screening as documented in Health Maintenance) for Tawny meets the Care Plan requirement. This Care Plan has been established and reviewed with the Patient.          Benito Medina MD  [facility]    Identified Health Risks:

## 2023-01-09 NOTE — PATIENT INSTRUCTIONS
Due for tetanus booster.  Cheaper at pharmacy.    2.  Consider screening mammogram.    3.  I will notify you of test results    4.  Follow-up in 4 to 6 months at new clinic.

## 2023-04-05 DIAGNOSIS — K21.9 ESOPHAGEAL REFLUX: ICD-10-CM

## 2023-04-06 NOTE — TELEPHONE ENCOUNTER
"Routing refill request to provider for review/approval because:  Should have 1 refill on file    Last Written Prescription Date:  1/2/23  Last Fill Quantity: 45,  # refills: 1   Last office visit provider:  1/9/23     Requested Prescriptions   Pending Prescriptions Disp Refills     omeprazole (PRILOSEC) 20 MG DR capsule 45 capsule 1     Sig: TAKE 1 CAPSULE EVERY OTHER DAY       PPI Protocol Failed - 4/5/2023  8:52 AM        Failed - Recent (12 mo) or future (30 days) visit within the authorizing provider's specialty     Patient has had an office visit with the authorizing provider or a provider within the authorizing providers department within the previous 12 mos or has a future within next 30 days. See \"Patient Info\" tab in inbasket, or \"Choose Columns\" in Meds & Orders section of the refill encounter.              Passed - Not on Clopidogrel (unless Pantoprazole ordered)        Passed - No diagnosis of osteoporosis on record        Passed - Medication is active on med list        Passed - Patient is age 18 or older        Passed - No active pregnacy on record        Passed - No positive pregnancy test in past 12 months             Balbina Ortega RN 04/06/23 12:24 AM  "

## 2024-01-11 ENCOUNTER — MEDICAL CORRESPONDENCE (OUTPATIENT)
Dept: SCHEDULING | Facility: CLINIC | Age: 89
End: 2024-01-11
Payer: MEDICARE

## 2024-01-12 ENCOUNTER — MEDICAL CORRESPONDENCE (OUTPATIENT)
Dept: HEALTH INFORMATION MANAGEMENT | Facility: CLINIC | Age: 89
End: 2024-01-12
Payer: MEDICARE

## 2024-04-18 ENCOUNTER — TRANSCRIBE ORDERS (OUTPATIENT)
Dept: OTHER | Age: 89
End: 2024-04-18

## 2024-04-18 DIAGNOSIS — R29.6 FALLS: Primary | ICD-10-CM

## 2024-05-03 ENCOUNTER — ANCILLARY PROCEDURE (OUTPATIENT)
Dept: BONE DENSITY | Facility: CLINIC | Age: 89
End: 2024-05-03
Attending: FAMILY MEDICINE
Payer: MEDICARE

## 2024-05-03 DIAGNOSIS — E28.39 ESTROGEN DEFICIENCY: ICD-10-CM

## 2024-05-03 DIAGNOSIS — M81.0 OSTEOPOROSIS: ICD-10-CM

## 2024-05-03 PROCEDURE — 77080 DXA BONE DENSITY AXIAL: CPT | Mod: TC | Performed by: PHYSICIAN ASSISTANT

## 2024-05-20 ENCOUNTER — LAB REQUISITION (OUTPATIENT)
Dept: LAB | Facility: CLINIC | Age: 89
End: 2024-05-20
Payer: MEDICARE

## 2024-05-20 DIAGNOSIS — D64.9 ANEMIA, UNSPECIFIED: ICD-10-CM

## 2024-05-21 LAB
ERYTHROCYTE [DISTWIDTH] IN BLOOD BY AUTOMATED COUNT: 13.2 % (ref 10–15)
HCT VFR BLD AUTO: 32.4 % (ref 35–47)
HGB BLD-MCNC: 11.1 G/DL (ref 11.7–15.7)
MCH RBC QN AUTO: 30.8 PG (ref 26.5–33)
MCHC RBC AUTO-ENTMCNC: 34.3 G/DL (ref 31.5–36.5)
MCV RBC AUTO: 90 FL (ref 78–100)
PLATELET # BLD AUTO: 337 10E3/UL (ref 150–450)
RBC # BLD AUTO: 3.6 10E6/UL (ref 3.8–5.2)
WBC # BLD AUTO: 6.3 10E3/UL (ref 4–11)

## 2024-05-21 PROCEDURE — 85027 COMPLETE CBC AUTOMATED: CPT | Mod: ORL | Performed by: NURSE PRACTITIONER

## 2024-05-21 PROCEDURE — 36415 COLL VENOUS BLD VENIPUNCTURE: CPT | Mod: ORL | Performed by: NURSE PRACTITIONER

## 2024-05-21 PROCEDURE — P9604 ONE-WAY ALLOW PRORATED TRIP: HCPCS | Mod: ORL | Performed by: NURSE PRACTITIONER
